# Patient Record
Sex: FEMALE | Race: BLACK OR AFRICAN AMERICAN | NOT HISPANIC OR LATINO | Employment: FULL TIME | URBAN - METROPOLITAN AREA
[De-identification: names, ages, dates, MRNs, and addresses within clinical notes are randomized per-mention and may not be internally consistent; named-entity substitution may affect disease eponyms.]

---

## 2018-06-28 ENCOUNTER — TELEPHONE (OUTPATIENT)
Dept: NEUROLOGY | Facility: CLINIC | Age: 58
End: 2018-06-28

## 2018-07-30 ENCOUNTER — OFFICE VISIT (OUTPATIENT)
Dept: NEUROLOGY | Facility: CLINIC | Age: 58
End: 2018-07-30
Payer: COMMERCIAL

## 2018-07-30 VITALS
HEART RATE: 60 BPM | DIASTOLIC BLOOD PRESSURE: 82 MMHG | BODY MASS INDEX: 35.5 KG/M2 | SYSTOLIC BLOOD PRESSURE: 124 MMHG | HEIGHT: 61 IN | WEIGHT: 188 LBS

## 2018-07-30 DIAGNOSIS — M54.16 RADICULOPATHY, LUMBAR REGION: Primary | ICD-10-CM

## 2018-07-30 PROCEDURE — 99204 OFFICE O/P NEW MOD 45 MIN: CPT | Performed by: PSYCHIATRY & NEUROLOGY

## 2018-07-30 RX ORDER — LORATADINE 10 MG/1
10 TABLET ORAL DAILY PRN
COMMUNITY
Start: 2018-03-09

## 2018-07-30 RX ORDER — GABAPENTIN 300 MG/1
300 CAPSULE ORAL 3 TIMES DAILY PRN
COMMUNITY
Start: 2017-08-07

## 2018-07-30 RX ORDER — AMLODIPINE BESYLATE AND BENAZEPRIL HYDROCHLORIDE 5; 20 MG/1; MG/1
1 CAPSULE ORAL EVERY MORNING
COMMUNITY
Start: 2018-07-30

## 2018-07-30 RX ORDER — CYCLOBENZAPRINE HCL 5 MG
5 TABLET ORAL DAILY PRN
COMMUNITY
Start: 2018-03-14

## 2018-07-30 NOTE — PROGRESS NOTES
Karla Gallo is a 60 y o  female  Chief Complaint   Patient presents with    Peripheral Neuropathy     left groin down leg to bottom of left foot       Assessment:  1  Radiculopathy, lumbar region        Plan:    Discussion:  Differential diagnosis discussed with the patient possible lumbar radiculopathy, though peripheral neuropathy is in the differential diagnosis, patient does not have any symptoms in the right leg, would recommend an MRI scan of the lumbar spine and EMG of bilateral lower extremity to evaluate for radiculopathy and neuropathy, patient to call me after the above tests discuss the results, patient has tried physical therapy in the past without much relief but we will repeat the physical therapy to see if she benefits from that,  she was advised to take fall and safety precautions, avoid strenuous activity, to go to the hospital if has any worsening symptoms and call me otherwise to see me back in 6-8 weeks and follow up with her other physicians  Subjective:    HPI   Patient is here for evaluation of left groin pain and low back pain radiating to the left leg associated with numbness and tingling for the last year and a half, according to the patient the symptoms are worse when she is walking and with activity and is about 6 on 10, relieved with rest, she denies any history of trauma, no bowel and bladder incontinence, no focal weakness, she was also having some left shoulder pain that has resolved, denies any numbness or tingling in the right leg except for occasional cramping in both feet at night time, she has tried physical therapy in the past without much relief, no other neurological complaints      Vitals:    07/30/18 1511   BP: 124/82   BP Location: Left arm   Patient Position: Sitting   Cuff Size: Adult   Pulse: 60   Weight: 85 3 kg (188 lb)   Height: 5' 1" (1 549 m)       Current Medications    Current Outpatient Prescriptions:     amLODIPine-benazepril (LOTREL 5-20) 5-20 MG per capsule, Take 1 capsule by mouth every morning, Disp: , Rfl:     cyclobenzaprine (FLEXERIL) 5 mg tablet, Take 5 mg by mouth daily as needed, Disp: , Rfl:     gabapentin (NEURONTIN) 300 mg capsule, Take 300 mg by mouth 3 (three) times a day as needed  , Disp: , Rfl:     loratadine (CLARITIN) 10 mg tablet, Take 10 mg by mouth daily as needed, Disp: , Rfl:     triamcinolone (KENALOG) 0 1 % ointment, Apply topically daily as needed, Disp: , Rfl:       Allergies  Patient has no known allergies  Past Medical History  Past Medical History:   Diagnosis Date    Arthritis     Chronic low back pain     Hypercholesteremia     Hypertension     Kidney stone          Past Surgical History:  Past Surgical History:   Procedure Laterality Date    LIPOMA RESECTION      stomach         Family History:  Family History   Problem Relation Age of Onset    Heart disease Mother     Diabetes Mother     Hypertension Mother     No Known Problems Father     No Known Problems Sister     Diabetes Brother     Alzheimer's disease Maternal Grandmother        Social History:   reports that she has never smoked  She has never used smokeless tobacco  She reports that she drinks alcohol  I have reviewed the past medical history, surgical history, social and family history, current medications, allergies vitals, review of systems, and updated this information as appropriate today  Objective:    Physical Exam    Neurological Exam  Patient is alert awake oriented, high functions are intact, speech is fluent  No evidence of any aphasia or dysarthria  Cranial nerve examination reveals visual fields are full to threat, pupils equal and reactive, extraocular movements intact, fundi showed sharp disc margins, sensation in the V1 V2 V3 distribution is symmetric, no obvious facial asymmetry noted, tongue is midline and gag is adequate  Hearing is intact bilaterally, shoulder shrug is intact bilaterally    Motor examination reveals normal tone and bulk, no evidence of any drift to the outstretched extremities, strength is 5/5 preserved bilaterally in both upper and lower extremities, deep tendon reflexes are intact, toes are downgoing  Sensory examination decreased light touch pinprick temperature sensation in the left leg on the lateral aspect compared to the right, patient does not extinguish double simultaneous stimuli  Coordination no evidence of any finger-to-nose dysmetria, no evidence of any dysdiadochokinesia,  Gait is normal based Romberg sign is negative  Paraspinal muscle tenderness in the lumbar area    ROS:  Review of Systems   Constitutional: Negative  Negative for appetite change and fever  HENT: Negative  Negative for hearing loss, tinnitus, trouble swallowing and voice change  Eyes: Negative  Negative for photophobia and pain  Respiratory: Negative  Negative for shortness of breath  Cardiovascular: Negative  Negative for palpitations  Gastrointestinal: Positive for abdominal pain  Negative for nausea and vomiting  Endocrine: Negative  Negative for cold intolerance and heat intolerance  Genitourinary: Negative  Negative for dysuria, frequency and urgency  Musculoskeletal: Positive for arthralgias, back pain, gait problem, myalgias and neck pain  Skin: Negative  Negative for rash  Neurological: Positive for numbness and headaches  Negative for dizziness, tremors, seizures, syncope, facial asymmetry, speech difficulty, weakness and light-headedness  Hematological: Negative  Does not bruise/bleed easily  Psychiatric/Behavioral: Negative  Negative for confusion, hallucinations and sleep disturbance

## 2018-08-29 ENCOUNTER — HOSPITAL ENCOUNTER (OUTPATIENT)
Dept: MRI IMAGING | Facility: CLINIC | Age: 58
Discharge: HOME | End: 2018-08-29
Payer: COMMERCIAL

## 2018-08-29 DIAGNOSIS — M54.16 RADICULOPATHY, LUMBAR REGION: ICD-10-CM

## 2018-08-29 PROCEDURE — 72148 MRI LUMBAR SPINE W/O DYE: CPT

## 2018-09-12 ENCOUNTER — PROCEDURE VISIT (OUTPATIENT)
Dept: NEUROLOGY | Facility: CLINIC | Age: 58
End: 2018-09-12
Payer: COMMERCIAL

## 2018-09-12 DIAGNOSIS — M54.16 RADICULOPATHY, LUMBAR REGION: ICD-10-CM

## 2018-09-12 PROCEDURE — 95910 NRV CNDJ TEST 7-8 STUDIES: CPT | Performed by: PHYSICAL MEDICINE & REHABILITATION

## 2018-09-12 PROCEDURE — 95886 MUSC TEST DONE W/N TEST COMP: CPT | Performed by: PHYSICAL MEDICINE & REHABILITATION

## 2018-09-12 NOTE — PROGRESS NOTES
The procedure was discussed with the patient  Verbal consent was obtained after discussing risks and benefits  A sterile concentric needle electrode was used  The patient tolerated the procedure well  There were no complications  EMG BILATERAL LOWER EXTREMITY    Motor and sensory conduction studies were performed on the bilateral peroneal, tibial and sural nerves  The distal motor latencies were normal  The motor action potential amplitudes were normal  Conduction velocities were normal including conduction of the peroneal nerve across the fibular head  Bilateral peroneal and tibial F waves were normal     Bilateral sural distal sensory latencies were normal with normal sensory action potential amplitudes  H  reflexes were symmetrically normal     Concentric needle EMG was performed in various distal and proximal muscles of the lower extremities bilaterally including EDB, tibialis anterior, gastrocnemius medius, vastus lateralis, gluteus medius and the low lumbar paraspinal regions  There is no evidence of spontaneous activity seen  Mild decreased recruitment of giant motor units was noted in the right gluteus medius and EDB  The compound motor unit potentials were of normal configuration and interference patterns were full or full for effort in the remaining muscles tested  IMPRESSION: There is electrophysiologic evidence of a:    1  Mild chronic L5 radiculopathy on the right as evidenced by the decreased recruitment and chronic denervation changes in the above-mentioned muscles  Clinical and imaging correlation of the lumbar spine is suggested      YASMIN Mckeon

## 2018-10-08 ENCOUNTER — OFFICE VISIT (OUTPATIENT)
Dept: NEUROLOGY | Facility: CLINIC | Age: 58
End: 2018-10-08
Payer: COMMERCIAL

## 2018-10-08 VITALS
HEART RATE: 62 BPM | SYSTOLIC BLOOD PRESSURE: 138 MMHG | HEIGHT: 62 IN | WEIGHT: 186 LBS | DIASTOLIC BLOOD PRESSURE: 96 MMHG | BODY MASS INDEX: 34.23 KG/M2

## 2018-10-08 DIAGNOSIS — M54.16 RADICULOPATHY, LUMBAR REGION: Primary | ICD-10-CM

## 2018-10-08 PROCEDURE — 99213 OFFICE O/P EST LOW 20 MIN: CPT | Performed by: PSYCHIATRY & NEUROLOGY

## 2018-10-08 NOTE — PROGRESS NOTES
Stanislaw Ritter is a 60 y o  female  Chief Complaint   Patient presents with    Back Pain       Assessment:  1  Radiculopathy, lumbar region        Plan:    Discussion:  Differential diagnosis discussed with the patient, her MRI scan of the lumbar spine an EMG results were reviewed with her, she could be having lumbar radiculopathy versus meralgia paresthetica, she finished physical therapy with some relief, she is currently not taking Neurontin, we discussed about it she is willing to try, she does have the medication, she is going to take 300 milligrams at nighttime and stop if experiences any side effects, she is also going to discuss with her family physician and see an orthopedic surgeon, avoid strenuous activity, to go to the hospital if has any worsening symptoms and call me otherwise to see me back in 4 months and follow up with her other physicians,    Subjective:    HPI   Patient is here in follow-up left groin and leg pain associated with some numbness and tingling, since her last visit she does not have any back pain, she went for physical therapy which helped her to some extent, she still has pain it is more of a burning pain in the groin area radiating to the left leg associated with some numbness and tingling, no bowel and bladder incontinence,  no focal weakness    She does not know the triggering factors, pain is slightly relieved with rest, no other complaints    Vitals:    10/08/18 1240   BP: 138/96   Pulse: 62   Weight: 84 4 kg (186 lb)   Height: 5' 2" (1 575 m)       Current Medications    Current Outpatient Prescriptions:     amLODIPine-benazepril (LOTREL 5-20) 5-20 MG per capsule, Take 1 capsule by mouth every morning, Disp: , Rfl:     cyclobenzaprine (FLEXERIL) 5 mg tablet, Take 5 mg by mouth daily as needed, Disp: , Rfl:     gabapentin (NEURONTIN) 300 mg capsule, Take 300 mg by mouth 3 (three) times a day as needed  , Disp: , Rfl:     loratadine (CLARITIN) 10 mg tablet, Take 10 mg by mouth daily as needed, Disp: , Rfl:     triamcinolone (KENALOG) 0 1 % ointment, Apply topically daily as needed, Disp: , Rfl:       Allergies  Patient has no known allergies  Past Medical History  Past Medical History:   Diagnosis Date    Arthritis     Chronic low back pain     Hypercholesteremia     Hypertension     Kidney stone     Lumbar radiculopathy          Past Surgical History:  Past Surgical History:   Procedure Laterality Date    LIPOMA RESECTION      stomach         Family History:  Family History   Problem Relation Age of Onset    Heart disease Mother     Diabetes Mother     Hypertension Mother     No Known Problems Father     No Known Problems Sister     Diabetes Brother     Alzheimer's disease Maternal Grandmother        Social History:   reports that she has never smoked  She has never used smokeless tobacco  She reports that she drinks alcohol  She reports that she does not use drugs  I have reviewed the past medical history, surgical history, social and family history, current medications, allergies vitals, review of systems, and updated this information as appropriate today  Objective:    Physical Exam    Neurological Exam    GENERAL:  Cooperative in no acute distress  Well-developed and well-nourished    HEAD and NECK   Head is atraumatic normocephalic with no lesions or masses  Neck is supple with full range of motion    CARDIOVASCULAR  Carotid Arteries-no carotid bruits  NEUROLOGIC:  Mental Status-the patient is awake alert and oriented without aphasia or apraxia  Cranial Nerves: Visual fields are full to confrontation  Extraocular movements are full without nystagmus  Pupils are 2-1/2 mm and reactive  Face is symmetrical to light touch  Movements of facial expression move symmetrically  Hearing is normal to finger rub bilaterally  Soft palate lifts symmetrically  Shoulder shrug is symmetrical  Tongue is midline without atrophy    Motor: No drift is noted on arm extension  Strength is full in the upper and lower extremities with normal bulk and tone  Sensory: Intact to temperature and vibratory sensation in the upper and lower extremities bilaterally  Cortical function is intact  Coordination: Finger to nose testing is performed accurately  Romberg is negative  Gait reveals a normal base with symmetrical arm swing  Reflexes:  1+ and symmetrical   No lumbar spine tenderness  ROS:  Review of Systems   Constitutional: Negative  HENT: Negative  Eyes: Negative  Respiratory: Negative  Cardiovascular: Negative  Gastrointestinal: Negative  Endocrine: Negative  Genitourinary: Negative  Musculoskeletal: Positive for back pain and joint swelling  Skin: Negative  Allergic/Immunologic: Negative  Neurological: Negative  Hematological: Negative  Psychiatric/Behavioral: Negative

## 2019-02-26 ENCOUNTER — TELEPHONE (OUTPATIENT)
Dept: NEUROLOGY | Facility: CLINIC | Age: 59
End: 2019-02-26

## 2019-02-26 NOTE — TELEPHONE ENCOUNTER
Called patient to see if she would be available to come in on Thursday February 28 at 830  Patient is on wait list to be seen sooner  No answer  Left voicemail  Detail Level: Zone Detail Level: Detailed

## 2020-02-18 ENCOUNTER — APPOINTMENT (EMERGENCY)
Dept: CT IMAGING | Facility: HOSPITAL | Age: 60
End: 2020-02-18
Payer: COMMERCIAL

## 2020-02-18 ENCOUNTER — TELEPHONE (OUTPATIENT)
Dept: OTHER | Facility: HOSPITAL | Age: 60
End: 2020-02-18

## 2020-02-18 ENCOUNTER — HOSPITAL ENCOUNTER (EMERGENCY)
Facility: HOSPITAL | Age: 60
Discharge: HOME | End: 2020-02-18
Attending: EMERGENCY MEDICINE | Admitting: EMERGENCY MEDICINE
Payer: COMMERCIAL

## 2020-02-18 VITALS
HEIGHT: 62 IN | TEMPERATURE: 97.8 F | RESPIRATION RATE: 20 BRPM | DIASTOLIC BLOOD PRESSURE: 97 MMHG | HEART RATE: 87 BPM | OXYGEN SATURATION: 99 % | WEIGHT: 180 LBS | SYSTOLIC BLOOD PRESSURE: 197 MMHG | BODY MASS INDEX: 33.13 KG/M2

## 2020-02-18 DIAGNOSIS — N20.0 KIDNEY STONE ON LEFT SIDE: Primary | ICD-10-CM

## 2020-02-18 DIAGNOSIS — N13.30 HYDRONEPHROSIS: ICD-10-CM

## 2020-02-18 LAB
ALBUMIN SERPL BCP-MCNC: 3.3 G/DL (ref 3.5–5)
ALP SERPL-CCNC: 99 U/L (ref 46–116)
ALT SERPL W P-5'-P-CCNC: 40 U/L (ref 12–78)
ANION GAP SERPL CALCULATED.3IONS-SCNC: 10 MMOL/L (ref 4–13)
AST SERPL W P-5'-P-CCNC: 20 U/L (ref 5–45)
BACTERIA UR QL AUTO: ABNORMAL /HPF
BASOPHILS # BLD AUTO: 0.03 THOUSANDS/ΜL (ref 0–0.1)
BASOPHILS NFR BLD AUTO: 0 % (ref 0–1)
BILIRUB SERPL-MCNC: 0.2 MG/DL (ref 0.2–1)
BILIRUB UR QL STRIP: NEGATIVE
BUN SERPL-MCNC: 16 MG/DL (ref 5–25)
CALCIUM SERPL-MCNC: 9.4 MG/DL (ref 8.3–10.1)
CHLORIDE SERPL-SCNC: 103 MMOL/L (ref 100–108)
CLARITY UR: CLEAR
CO2 SERPL-SCNC: 28 MMOL/L (ref 21–32)
COLOR UR: YELLOW
CREAT SERPL-MCNC: 1.05 MG/DL (ref 0.6–1.3)
EOSINOPHIL # BLD AUTO: 0.06 THOUSAND/ΜL (ref 0–0.61)
EOSINOPHIL NFR BLD AUTO: 1 % (ref 0–6)
ERYTHROCYTE [DISTWIDTH] IN BLOOD BY AUTOMATED COUNT: 13.1 % (ref 11.6–15.1)
GFR SERPL CREATININE-BSD FRML MDRD: 58 ML/MIN/1.73SQ M
GLUCOSE SERPL-MCNC: 114 MG/DL (ref 65–140)
GLUCOSE UR STRIP-MCNC: NEGATIVE MG/DL
HCT VFR BLD AUTO: 43.3 % (ref 34.8–46.1)
HGB BLD-MCNC: 13.5 G/DL (ref 11.5–15.4)
HGB UR QL STRIP.AUTO: ABNORMAL
IMM GRANULOCYTES # BLD AUTO: 0.06 THOUSAND/UL (ref 0–0.2)
IMM GRANULOCYTES NFR BLD AUTO: 1 % (ref 0–2)
KETONES UR STRIP-MCNC: NEGATIVE MG/DL
LEUKOCYTE ESTERASE UR QL STRIP: ABNORMAL
LIPASE SERPL-CCNC: 189 U/L (ref 73–393)
LYMPHOCYTES # BLD AUTO: 2.44 THOUSANDS/ΜL (ref 0.6–4.47)
LYMPHOCYTES NFR BLD AUTO: 24 % (ref 14–44)
MCH RBC QN AUTO: 30.8 PG (ref 26.8–34.3)
MCHC RBC AUTO-ENTMCNC: 31.2 G/DL (ref 31.4–37.4)
MCV RBC AUTO: 99 FL (ref 82–98)
MONOCYTES # BLD AUTO: 0.5 THOUSAND/ΜL (ref 0.17–1.22)
MONOCYTES NFR BLD AUTO: 5 % (ref 4–12)
NEUTROPHILS # BLD AUTO: 7.29 THOUSANDS/ΜL (ref 1.85–7.62)
NEUTS SEG NFR BLD AUTO: 69 % (ref 43–75)
NITRITE UR QL STRIP: NEGATIVE
NON-SQ EPI CELLS URNS QL MICRO: ABNORMAL /HPF
NRBC BLD AUTO-RTO: 0 /100 WBCS
PH UR STRIP.AUTO: 7 [PH]
PLATELET # BLD AUTO: 268 THOUSANDS/UL (ref 149–390)
PMV BLD AUTO: 9.8 FL (ref 8.9–12.7)
POTASSIUM SERPL-SCNC: 3.8 MMOL/L (ref 3.5–5.3)
PROT SERPL-MCNC: 7.7 G/DL (ref 6.4–8.2)
PROT UR STRIP-MCNC: NEGATIVE MG/DL
RBC # BLD AUTO: 4.39 MILLION/UL (ref 3.81–5.12)
RBC #/AREA URNS AUTO: ABNORMAL /HPF
SODIUM SERPL-SCNC: 141 MMOL/L (ref 136–145)
SP GR UR STRIP.AUTO: 1.01 (ref 1–1.03)
UROBILINOGEN UR QL STRIP.AUTO: 0.2 E.U./DL
WBC # BLD AUTO: 10.38 THOUSAND/UL (ref 4.31–10.16)
WBC #/AREA URNS AUTO: ABNORMAL /HPF

## 2020-02-18 PROCEDURE — 74176 CT ABD & PELVIS W/O CONTRAST: CPT

## 2020-02-18 PROCEDURE — 85025 COMPLETE CBC W/AUTO DIFF WBC: CPT | Performed by: EMERGENCY MEDICINE

## 2020-02-18 PROCEDURE — 83690 ASSAY OF LIPASE: CPT | Performed by: EMERGENCY MEDICINE

## 2020-02-18 PROCEDURE — 99284 EMERGENCY DEPT VISIT MOD MDM: CPT

## 2020-02-18 PROCEDURE — 36415 COLL VENOUS BLD VENIPUNCTURE: CPT | Performed by: EMERGENCY MEDICINE

## 2020-02-18 PROCEDURE — 80053 COMPREHEN METABOLIC PANEL: CPT | Performed by: EMERGENCY MEDICINE

## 2020-02-18 PROCEDURE — 99285 EMERGENCY DEPT VISIT HI MDM: CPT | Performed by: EMERGENCY MEDICINE

## 2020-02-18 PROCEDURE — 81001 URINALYSIS AUTO W/SCOPE: CPT | Performed by: EMERGENCY MEDICINE

## 2020-02-18 PROCEDURE — 96374 THER/PROPH/DIAG INJ IV PUSH: CPT

## 2020-02-18 PROCEDURE — 96375 TX/PRO/DX INJ NEW DRUG ADDON: CPT

## 2020-02-18 RX ORDER — ONDANSETRON 2 MG/ML
INJECTION INTRAMUSCULAR; INTRAVENOUS
Status: COMPLETED
Start: 2020-02-18 | End: 2020-02-18

## 2020-02-18 RX ORDER — KETOROLAC TROMETHAMINE 30 MG/ML
15 INJECTION, SOLUTION INTRAMUSCULAR; INTRAVENOUS ONCE
Status: COMPLETED | OUTPATIENT
Start: 2020-02-18 | End: 2020-02-18

## 2020-02-18 RX ORDER — HYDROMORPHONE HCL/PF 1 MG/ML
0.5 SYRINGE (ML) INJECTION ONCE AS NEEDED
Status: COMPLETED | OUTPATIENT
Start: 2020-02-18 | End: 2020-02-18

## 2020-02-18 RX ORDER — NAPROXEN 500 MG/1
500 TABLET ORAL 2 TIMES DAILY PRN
Qty: 20 TABLET | Refills: 0 | Status: SHIPPED | OUTPATIENT
Start: 2020-02-18 | End: 2020-03-31

## 2020-02-18 RX ORDER — ONDANSETRON 4 MG/1
4 TABLET, ORALLY DISINTEGRATING ORAL EVERY 8 HOURS PRN
Qty: 10 TABLET | Refills: 0 | Status: SHIPPED | OUTPATIENT
Start: 2020-02-18 | End: 2020-03-31

## 2020-02-18 RX ORDER — MORPHINE SULFATE 15 MG/1
15 TABLET ORAL EVERY 6 HOURS PRN
Qty: 11 TABLET | Refills: 0 | Status: SHIPPED | OUTPATIENT
Start: 2020-02-18 | End: 2020-02-25

## 2020-02-18 RX ORDER — TAMSULOSIN HYDROCHLORIDE 0.4 MG/1
0.4 CAPSULE ORAL
Qty: 5 CAPSULE | Refills: 0 | Status: SHIPPED | OUTPATIENT
Start: 2020-02-18 | End: 2020-02-21 | Stop reason: SDUPTHER

## 2020-02-18 RX ORDER — ONDANSETRON 2 MG/ML
4 INJECTION INTRAMUSCULAR; INTRAVENOUS ONCE
Status: COMPLETED | OUTPATIENT
Start: 2020-02-18 | End: 2020-02-18

## 2020-02-18 RX ADMIN — ONDANSETRON 4 MG: 2 INJECTION INTRAMUSCULAR; INTRAVENOUS at 04:26

## 2020-02-18 RX ADMIN — HYDROMORPHONE HYDROCHLORIDE 0.5 MG: 1 INJECTION, SOLUTION INTRAMUSCULAR; INTRAVENOUS; SUBCUTANEOUS at 04:24

## 2020-02-18 RX ADMIN — KETOROLAC TROMETHAMINE 15 MG: 30 INJECTION, SOLUTION INTRAMUSCULAR at 04:20

## 2020-02-18 NOTE — ED PROVIDER NOTES
History  Chief Complaint   Patient presents with    Abdominal Pain     was sleeping and woke up with left flank pain that radiates into left lower abd/groin area, also states she feels like she has to pee but can't      60y o  year-old female presents with 2 hours of left flank pain with radiation down the left flank associated with urinary hesitancy  Patient describes severe sharp pain that came on suddenly and continues in the ER  Patient states nothing aggravates the pain and nothing alleviates it  Patient has a history of prior incidentally noted kidney stones but denies history of renal colic  Patient has a history of chronic lumbar radiculopathy but states this pain is different than her prior episodes that she sees pain management  ROS: Patient denies fever/chills, nausea/vomiting, diarrhea, dyspnea, anorexia, constipation, diaphoresis, chest pain, groin pain, dysuria, hematuria, melena, or back/neck pain  All other systems reviewed and negative  Objective:   Vital signs reviewed  Constitutional: moderate acute distress  Eyes: No scleral icterus  Respiratory: Lungs clear to auscultation bilaterally without adventitious sounds  Abdomen: Inspection of an obese abdomen with previous abdominal surgical incisions noted without erythema, rashes or ecchymosis noted  No abdominal pulsations noted  Normal bowel sounds with no bruit auscultated  Soft abdomen  Palpitation noted no anterior tenderness; tenderness not over McBurneys point  No masses or pulsatile aorta noted on examination  No rebound or guarding noted on examination, non-peritoneal exam  Negative psoas, obturator, and Rovsings signs  Negative Salvadors sign  Back: Normal inspection with no rash or signs of herpes zoster  Costovertebral tenderness present on the left  Skin: No ecchymosis of the umbilicus (negative Trevors sign) or flank (negative Werner Knowless sign)  Warm and dry       Medical Decision Making   Patient presents with flank pain with a broad differential including intra-abdominal pathologies such as nephrolithiasis and pyelonephritis  As patient notes associated urinary symptoms, most likely associated with renal pathology  Patient denies poorly controlled diabetes, chronic kidney disease, history of congenital urinary abnormality or renal transplant, or history of immunocompromised state  Based on the patients presentation and symptoms, laboratory evaluation to include urinalysis to evaluate for hematuria and infectious etiologies, BMP to evaluate renal function and electrolytes, and CBC to evaluate for leukocytosis will be completed  Non-contrast CT imaging will be completed to evaluate for nephrolithiasis or underlying anatomic abnormality to detect processes that may delay response to therapy or complications such as renal or perinephric assesses  Flank Pain   Pain location:  L flank  Pain quality: sharp    Pain radiates to:  L leg  Pain severity:  Severe  Onset quality:  Sudden  Timing:  Constant  Progression:  Unchanged  Relieved by:  Nothing  Worsened by:  Nothing  Associated symptoms: no anorexia, no belching, no chest pain, no chills, no constipation, no cough, no diarrhea, no dysuria, no fatigue, no fever, no hematemesis, no hematochezia, no hematuria, no melena, no nausea, no shortness of breath, no sore throat, no vaginal bleeding, no vaginal discharge and no vomiting        Prior to Admission Medications   Prescriptions Last Dose Informant Patient Reported? Taking?    amLODIPine-benazepril (LOTREL 5-20) 5-20 MG per capsule   Yes No   Sig: Take 1 capsule by mouth every morning   cyclobenzaprine (FLEXERIL) 5 mg tablet   Yes No   Sig: Take 5 mg by mouth daily as needed   gabapentin (NEURONTIN) 300 mg capsule   Yes No   Sig: Take 300 mg by mouth 3 (three) times a day as needed     loratadine (CLARITIN) 10 mg tablet   Yes No   Sig: Take 10 mg by mouth daily as needed   triamcinolone (KENALOG) 0 1 % ointment   Yes No   Sig: Apply topically daily as needed      Facility-Administered Medications: None       Past Medical History:   Diagnosis Date    Arthritis     Chronic low back pain     Hypercholesteremia     Hypertension     Kidney stone     Lumbar radiculopathy        Past Surgical History:   Procedure Laterality Date    LIPOMA RESECTION      stomach       Family History   Problem Relation Age of Onset    Heart disease Mother     Diabetes Mother     Hypertension Mother     No Known Problems Father     No Known Problems Sister     Diabetes Brother     Alzheimer's disease Maternal Grandmother      I have reviewed and agree with the history as documented  Social History     Tobacco Use    Smoking status: Never Smoker    Smokeless tobacco: Never Used   Substance Use Topics    Alcohol use: Yes     Comment: socially    Drug use: No       Review of Systems   Constitutional: Negative for chills, fatigue and fever  HENT: Negative for sore throat  Respiratory: Negative for cough and shortness of breath  Cardiovascular: Negative for chest pain  Gastrointestinal: Negative for anorexia, constipation, diarrhea, hematemesis, hematochezia, melena, nausea and vomiting  Genitourinary: Positive for flank pain  Negative for dysuria, hematuria, vaginal bleeding and vaginal discharge  All other systems reviewed and are negative  Physical Exam  Physical Exam   Constitutional: She appears well-developed and well-nourished  She appears distressed  HENT:   Head: Normocephalic and atraumatic  Eyes: Pupils are equal, round, and reactive to light  Neck: Neck supple  Cardiovascular: Normal rate and regular rhythm  Pulmonary/Chest: Effort normal and breath sounds normal    Abdominal: Soft  Bowel sounds are normal  She exhibits no distension  There is no tenderness  There is CVA tenderness  There is no rigidity, no rebound and no guarding  Musculoskeletal: She exhibits no deformity  Neurological: She is alert  Skin: Skin is warm and dry  Psychiatric: She has a normal mood and affect  Vitals reviewed        Vital Signs  ED Triage Vitals [02/18/20 0310]   Temperature Pulse Respirations Blood Pressure SpO2   97 8 °F (36 6 °C) 87 20 (!) 197/97 99 %      Temp Source Heart Rate Source Patient Position - Orthostatic VS BP Location FiO2 (%)   Oral Monitor Sitting Right arm --      Pain Score       8           Vitals:    02/18/20 0310   BP: (!) 197/97   Pulse: 87   Patient Position - Orthostatic VS: Sitting         Visual Acuity      ED Medications  Medications   ketorolac (TORADOL) injection 15 mg (15 mg Intravenous Given 2/18/20 0420)   HYDROmorphone (DILAUDID) injection 0 5 mg (0 5 mg Intravenous Given 2/18/20 0424)   ondansetron (ZOFRAN) injection 4 mg (4 mg Intravenous Given 2/18/20 0426)       Diagnostic Studies  Results Reviewed     Procedure Component Value Units Date/Time    Comprehensive metabolic panel [[id number]]  (Abnormal) Collected:  02/18/20 0332    Lab Status:  Final result Specimen:  Blood from Arm, Left Updated:  02/18/20 0400     Sodium 141 mmol/L      Potassium 3 8 mmol/L      Chloride 103 mmol/L      CO2 28 mmol/L      ANION GAP 10 mmol/L      BUN 16 mg/dL      Creatinine 1 05 mg/dL      Glucose 114 mg/dL      Calcium 9 4 mg/dL      AST 20 U/L      ALT 40 U/L      Alkaline Phosphatase 99 U/L      Total Protein 7 7 g/dL      Albumin 3 3 g/dL      Total Bilirubin 0 20 mg/dL      eGFR 58 ml/min/1 73sq m     Narrative:       Marjan guidelines for Chronic Kidney Disease (CKD):     Stage 1 with normal or high GFR (GFR > 90 mL/min/1 73 square meters)    Stage 2 Mild CKD (GFR = 60-89 mL/min/1 73 square meters)    Stage 3A Moderate CKD (GFR = 45-59 mL/min/1 73 square meters)    Stage 3B Moderate CKD (GFR = 30-44 mL/min/1 73 square meters)    Stage 4 Severe CKD (GFR = 15-29 mL/min/1 73 square meters)    Stage 5 End Stage CKD (GFR <15 mL/min/1 73 square meters)  Note: GFR calculation is accurate only with a steady state creatinine    Lipase [[id number]]  (Normal) Collected:  02/18/20 0332    Lab Status:  Final result Specimen:  Blood from Arm, Left Updated:  02/18/20 0400     Lipase 189 u/L     Urine Microscopic [[id number]]  (Abnormal) Collected:  02/18/20 0325    Lab Status:  Final result Specimen:  Urine, Clean Catch Updated:  02/18/20 0354     RBC, UA 30-50 /hpf      WBC, UA 1-2 /hpf      Epithelial Cells Occasional /hpf      Bacteria, UA Occasional /hpf     UA w Reflex to Microscopic w Reflex to Culture [[id number]]  (Abnormal) Collected:  02/18/20 0325    Lab Status:  Final result Specimen:  Urine, Clean Catch Updated:  02/18/20 0340     Color, UA Yellow     Clarity, UA Clear     Specific Gravity, UA 1 010     pH, UA 7 0     Leukocytes, UA Trace     Nitrite, UA Negative     Protein, UA Negative mg/dl      Glucose, UA Negative mg/dl      Ketones, UA Negative mg/dl      Urobilinogen, UA 0 2 E U /dl      Bilirubin, UA Negative     Blood, UA Large    CBC and differential [[id number]]  (Abnormal) Collected:  02/18/20 0332    Lab Status:  Final result Specimen:  Blood from Arm, Left Updated:  02/18/20 0339     WBC 10 38 Thousand/uL      RBC 4 39 Million/uL      Hemoglobin 13 5 g/dL      Hematocrit 43 3 %      MCV 99 fL      MCH 30 8 pg      MCHC 31 2 g/dL      RDW 13 1 %      MPV 9 8 fL      Platelets 257 Thousands/uL      nRBC 0 /100 WBCs      Neutrophils Relative 69 %      Immat GRANS % 1 %      Lymphocytes Relative 24 %      Monocytes Relative 5 %      Eosinophils Relative 1 %      Basophils Relative 0 %      Neutrophils Absolute 7 29 Thousands/µL      Immature Grans Absolute 0 06 Thousand/uL      Lymphocytes Absolute 2 44 Thousands/µL      Monocytes Absolute 0 50 Thousand/µL      Eosinophils Absolute 0 06 Thousand/µL      Basophils Absolute 0 03 Thousands/µL                  CT abdomen pelvis wo contrast   Final Result by Laurel Mills MD (02/18 8867) 1   Moderate to severe left hydroureteronephrosis due to 6 mm obstructing calculus at the UVJ  2   Small hiatal hernia  The study was marked in [facility] for immediate notification  Workstation performed: [id number]                    Procedures  Procedures         ED Course  ED Course as of Feb 18 0602   Tue Feb 18, 2020   Tushar Coppola Prior urology assessment:    Assessment  This patient has a left lower pole renal stone measuring 10 mm  I'm not sure this was the cause of her pain  I certainly don't think was the cause of her epigastric pain but may be the cause of her flank pain  It is not in a position which is causing obstruction  I reviewed these findings with the patient  We talked about various treatment options  One option would be to observe this stone with a follow-up imaging in 6 months  Another option would be to treat this stone and I think the best way to treated with B with lithotripsy  I could not guarantee her that her pain would get better with treatment of the stone  Given that she is decided to just watch the stone so will get repeat imaging in 6 months         0503 Discussed with Urology (Martell Dai) who agreed is appropriate trial patient on attempted outpatient management  She will with the patient for outpatient management and stenting if necessary  5661 Patient has a left kidney stone, likely the source of her symptoms  I explained this to the patient, showed her imaging  I discussed symptomatic management  Patient previously had 10 mm stone though it is currently 6 mm at the UVJ  I discussed likely passing however the need for follow-up with Urology in case symptoms persist     Discussed symptomatic management in detail including risks and benefits of these medications  Specifically discussed risks of opiate use with the patient  Patient does see pain management but is not on opiates    Patient with appropriate follow-up regarding that and this is a new diagnosis where opiates are appropriate adjunct  Discussed risks of tamsulosin, patient has a kidney stone that is greater than 5 mm that may benefit from expulsive therapy  Discussed particular orthostasis associated with this  Discussed the need for follow-up with Urology and return precautions in detail  7046 I have reasonably determine that electronically prescribing a controlled substance would be impractical for the patient to obtain the controlled substance prescribed by electronic prescription or would cause an untimely delay resulting in an adverse impact on the patient's medical condition   aware queried with no unusual prescription patterns  MDM      Disposition  Final diagnoses:   Kidney stone on left side   Hydronephrosis     Time reflects when diagnosis was documented in both MDM as applicable and the Disposition within this note     Time User Action Codes Description Comment    2/18/2020  5:22 AM Delia Freeman Add [N20 0] Kidney stone on left side     2/18/2020  5:22 AM Delia Freeman Add [N13 30] Hydronephrosis       ED Disposition     ED Disposition Condition Date/Time Comment    Discharge Stable Tue Feb 18, 2020  5:22 AM Thressa Hatchet A Romney discharge to home/self care  Follow-up Information     Follow up With Specialties Details Why Contact Info Additional [address] For Urology [facility] Urology Schedule an appointment as soon as possible for a visit in 3 days Follow-up on kidney stone   [address] [address]  701  [facility], [address], [address], [facility], [place], [address]    [facility] Emergency Department Emergency Medicine Go to  If symptoms worsen [address] [facility] [address]  [address] [place] [address] ED, [address] [place], [place], [address]          Discharge Medication List as of 2/18/2020  5:27 AM      START taking these medications    Details   morphine (MSIR) 15 mg tablet Take 1 tablet (15 mg total) by mouth every 6 (six) hours as needed for severe pain for up to 7 daysMax Daily Amount: 60 mg, Starting Tue 2/18/2020, Until Tue 2/25/2020, Print      naproxen (NAPROSYN) 500 mg tablet Take 1 tablet (500 mg total) by mouth 2 (two) times a day as needed for moderate pain for up to 20 doses, Starting Tue 2/18/2020, Print      ondansetron (ZOFRAN-ODT) 4 mg disintegrating tablet Take 1 tablet (4 mg total) by mouth every 8 (eight) hours as needed for nausea or vomiting, Starting Tue 2/18/2020, Print      tamsulosin (FLOMAX) 0 4 mg Take 1 capsule (0 4 mg total) by mouth daily with dinner for 5 days, Starting Tue 2/18/2020, Until Sun 2/23/2020, Print         CONTINUE these medications which have NOT CHANGED    Details   amLODIPine-benazepril (LOTREL 5-20) 5-20 MG per capsule Take 1 capsule by mouth every morning, Starting Mon 7/30/2018, Historical Med      cyclobenzaprine (FLEXERIL) 5 mg tablet Take 5 mg by mouth daily as needed, Starting Wed 3/14/2018, Historical Med      gabapentin (NEURONTIN) 300 mg capsule Take 300 mg by mouth 3 (three) times a day as needed  , Starting Mon 8/7/2017, Historical Med      loratadine (CLARITIN) 10 mg tablet Take 10 mg by mouth daily as needed, Starting Fri 3/9/2018, Historical Med      triamcinolone (KENALOG) 0 1 % ointment Apply topically daily as needed, Starting Wed 3/22/2017, Historical Med           No discharge procedures on file      PDMP Review       Value Time User    PDMP Reviewed  Yes 2/18/2020  5:24 AM Michaela Mckeon MD          ED Provider  Electronically Signed by           Michaela Mckeon MD  02/18/20 5310

## 2020-02-21 ENCOUNTER — OFFICE VISIT (OUTPATIENT)
Dept: UROLOGY | Facility: CLINIC | Age: 60
End: 2020-02-21
Payer: COMMERCIAL

## 2020-02-21 VITALS
HEART RATE: 84 BPM | WEIGHT: 186 LBS | SYSTOLIC BLOOD PRESSURE: 126 MMHG | HEIGHT: 62 IN | BODY MASS INDEX: 34.23 KG/M2 | DIASTOLIC BLOOD PRESSURE: 70 MMHG

## 2020-02-21 DIAGNOSIS — N20.0 KIDNEY STONE ON LEFT SIDE: ICD-10-CM

## 2020-02-21 DIAGNOSIS — N20.0 RENAL STONES: Primary | ICD-10-CM

## 2020-02-21 LAB
SL AMB  POCT GLUCOSE, UA: ABNORMAL
SL AMB LEUKOCYTE ESTERASE,UA: ABNORMAL
SL AMB POCT BILIRUBIN,UA: ABNORMAL
SL AMB POCT BLOOD,UA: ABNORMAL
SL AMB POCT CLARITY,UA: CLEAR
SL AMB POCT COLOR,UA: YELLOW
SL AMB POCT KETONES,UA: ABNORMAL
SL AMB POCT NITRITE,UA: ABNORMAL
SL AMB POCT PH,UA: 6
SL AMB POCT SPECIFIC GRAVITY,UA: 1.02
SL AMB POCT URINE PROTEIN: ABNORMAL
SL AMB POCT UROBILINOGEN: ABNORMAL

## 2020-02-21 PROCEDURE — 99204 OFFICE O/P NEW MOD 45 MIN: CPT | Performed by: PHYSICIAN ASSISTANT

## 2020-02-21 PROCEDURE — 81002 URINALYSIS NONAUTO W/O SCOPE: CPT | Performed by: PHYSICIAN ASSISTANT

## 2020-02-21 RX ORDER — TAMSULOSIN HYDROCHLORIDE 0.4 MG/1
0.4 CAPSULE ORAL
Qty: 15 CAPSULE | Refills: 0 | Status: SHIPPED | OUTPATIENT
Start: 2020-02-21 | End: 2020-03-05

## 2020-02-21 RX ORDER — TAMSULOSIN HYDROCHLORIDE 0.4 MG/1
0.4 CAPSULE ORAL
Qty: 15 CAPSULE | Refills: 0 | Status: SHIPPED | OUTPATIENT
Start: 2020-02-21 | End: 2020-02-21 | Stop reason: SDUPTHER

## 2020-02-21 NOTE — PROGRESS NOTES
Assessment and plan:       1  Nephrolithiasis  - Patient was recently diagnosed with a 6 mm obstructing left ureteral stone at the UVJ  - She continues to be symptomatic and takes tamsulosin regularly as well as naproxen and morphine as needed for pain  - Urine continues to be positive for blood  Specimen will be sent for culture for preoperative purposes  - We discussed moving forward with both medical expulsive therapy as well as the option of ureteroscopy  It was recommended due to the patient's continued symptoms as well as the size of the stone that we schedule her for ureteroscopy  This will be canceled if she passes the stone in the interim  - Case request was placed for cystoscopy with left-sided ureteroscopy, holmium laser lithotripsy, retrograde pyelogram, and stent placement  Pre, marilyn, and postoperative course was discussed with the patient as well as surgical risks  - She will return in the near future for this procedure  Artemio Jean Baptiste PA-C      Chief Complaint     Chief Complaint   Patient presents with    Flank Pain    Nephrolithiasis         History of Present Illness     Vida Bernal is a 60 y o  female patient who recently established care with an outside urologist for history of renal stones  She was seen in the office on 01/20/2020 where a renal ultrasound was reviewed showing a 10 mm stone in the lower pole of the left kidney  She did endorse some epigastric and left flank pain as well as left back pain at that time  Due to her stone appearing to be nonobstructing, patient chose observation with follow-up in 6 months with this urologist     She presents to the emergency department on 02/18/2019 reporting waking up to left flank pain radiating to her left lower abdomen and groin  She reported feeling like she needed to void but was unable  CT scan was positive for a 6 mm obstructing stone at the left UVJ with severe left-sided hydroureteronephrosis      She continues to be symptomatic and urine is positive for blood  Laboratory     Lab Results   Component Value Date    CREATININE 1 05 02/18/2020       No results found for: PSA    Recent Results (from the past 1 hour(s))   POCT urine dip    Collection Time: 02/21/20  9:27 AM   Result Value Ref Range    LEUKOCYTE ESTERASE,UA trace     NITRITE,UA -     SL AMB POCT UROBILINOGEN -     POCT URINE PROTEIN trace      PH,UA 6 0     BLOOD,UA ++     SPECIFIC GRAVITY,UA 1 020     KETONES,UA -     BILIRUBIN,UA -     GLUCOSE, UA -      COLOR,UA yellow     CLARITY,UA clear          Review of Systems     Review of Systems   Constitutional: Negative for chills and fever  HENT: Negative  Eyes: Negative  Respiratory: Negative for shortness of breath  Cardiovascular: Negative for chest pain  Gastrointestinal: Positive for abdominal pain and constipation  Negative for diarrhea, nausea and vomiting  Genitourinary: Positive for flank pain  Negative for difficulty urinating, dysuria, enuresis, frequency, hematuria and urgency  Skin: Negative  Allergies     No Known Allergies    Physical Exam     Physical Exam   Constitutional: She is oriented to person, place, and time  She appears well-developed and well-nourished  No distress  HENT:   Head: Normocephalic and atraumatic  Right Ear: External ear normal    Left Ear: External ear normal    Nose: Nose normal    Eyes: Right eye exhibits no discharge  Left eye exhibits no discharge  No scleral icterus  Cardiovascular: Normal rate, regular rhythm, normal heart sounds and intact distal pulses  Exam reveals no gallop and no friction rub  No murmur heard  Pulmonary/Chest: Effort normal and breath sounds normal  No stridor  No respiratory distress  She has no wheezes  She has no rales  Abdominal: Soft  She exhibits no distension  There is no tenderness  There is no guarding     Genitourinary:   Genitourinary Comments: Negative for CVA tenderness bilaterally   Musculoskeletal:   Ambulates independently   Neurological: She is alert and oriented to person, place, and time  Skin: Skin is warm and dry  She is not diaphoretic  Psychiatric: She has a normal mood and affect  Her behavior is normal  Judgment and thought content normal    Nursing note and vitals reviewed          Vital Signs     Vitals:    02/21/20 0922   BP: 126/70   BP Location: Left arm   Patient Position: Sitting   Cuff Size: Standard   Pulse: 84   Weight: 84 4 kg (186 lb)   Height: 5' 2" (1 575 m)         Current Medications       Current Outpatient Medications:     amLODIPine-benazepril (LOTREL 5-20) 5-20 MG per capsule, Take 1 capsule by mouth every morning, Disp: , Rfl:     cyclobenzaprine (FLEXERIL) 5 mg tablet, Take 5 mg by mouth daily as needed, Disp: , Rfl:     gabapentin (NEURONTIN) 300 mg capsule, Take 300 mg by mouth 3 (three) times a day as needed  , Disp: , Rfl:     loratadine (CLARITIN) 10 mg tablet, Take 10 mg by mouth daily as needed, Disp: , Rfl:     morphine (MSIR) 15 mg tablet, Take 1 tablet (15 mg total) by mouth every 6 (six) hours as needed for severe pain for up to 7 daysMax Daily Amount: 60 mg, Disp: 11 tablet, Rfl: 0    naproxen (NAPROSYN) 500 mg tablet, Take 1 tablet (500 mg total) by mouth 2 (two) times a day as needed for moderate pain for up to 20 doses, Disp: 20 tablet, Rfl: 0    ondansetron (ZOFRAN-ODT) 4 mg disintegrating tablet, Take 1 tablet (4 mg total) by mouth every 8 (eight) hours as needed for nausea or vomiting, Disp: 10 tablet, Rfl: 0    tamsulosin (FLOMAX) 0 4 mg, Take 1 capsule (0 4 mg total) by mouth daily with dinner for 5 days, Disp: 5 capsule, Rfl: 0    triamcinolone (KENALOG) 0 1 % ointment, Apply topically daily as needed, Disp: , Rfl:       Active Problems     Patient Active Problem List   Diagnosis    Radiculopathy, lumbar region         Past Medical History     Past Medical History:   Diagnosis Date    Arthritis     Chronic low back pain     Hypercholesteremia     Hypertension     Kidney stone     Lumbar radiculopathy          Surgical History     Past Surgical History:   Procedure Laterality Date    LIPOMA RESECTION      stomach         Family History     Family History   Problem Relation Age of Onset    Heart disease Mother     Diabetes Mother     Hypertension Mother     No Known Problems Father     No Known Problems Sister     Diabetes Brother     Alzheimer's disease Maternal Grandmother          Social History     Social History       Radiology

## 2020-02-25 ENCOUNTER — ANESTHESIA EVENT (OUTPATIENT)
Dept: PERIOP | Facility: AMBULARY SURGERY CENTER | Age: 60
End: 2020-02-25
Payer: COMMERCIAL

## 2020-02-25 ENCOUNTER — PREP FOR PROCEDURE (OUTPATIENT)
Dept: UROLOGY | Facility: CLINIC | Age: 60
End: 2020-02-25

## 2020-02-25 ENCOUNTER — TELEPHONE (OUTPATIENT)
Dept: UROLOGY | Facility: CLINIC | Age: 60
End: 2020-02-25

## 2020-02-25 DIAGNOSIS — Z01.818 PRE-OP TESTING: ICD-10-CM

## 2020-02-25 DIAGNOSIS — N20.0 RENAL STONES: Primary | ICD-10-CM

## 2020-02-25 NOTE — TELEPHONE ENCOUNTER
I spoke with pt this afternoon to discuss getting her scheduled for a cysto/ L  RGP/ L  URS w/ litho/ L  Stent insertion with one of our providers  I offered her 3 dates including 3/6/20 at the [facility] with Dr Bharat Berg, 3/10/20 with Dr Markel Chin at the [facility] and 3/16/20 with Dr Nette Hopson at the [facility] (pt  Preferred)  Pt asked for sometime to think about it and she will call me back to schedule

## 2020-02-25 NOTE — TELEPHONE ENCOUNTER
Pt  Returned my phone call and stated that she spoke with her job and has decided to schedule this procedure at the [facility] on 3/10/20  I verbally went over all of pt 's pre op instructions and prep information with her  Pt is aware that she needs to have a urine culture and EKG done as soon as possible and will have them done at the [facility]  Per pt 's request I e-mailed her a copy of her surgical packet and instructed her to call me with any questions or concerns regarding this procedure

## 2020-02-27 RX ORDER — RANITIDINE 150 MG/1
150 CAPSULE ORAL AS NEEDED
COMMUNITY

## 2020-02-27 NOTE — PRE-PROCEDURE INSTRUCTIONS
Pre-Surgery Instructions:   Medication Instructions    amLODIPine-benazepril (LOTREL 5-20) 5-20 MG per capsule Instructed patient per Anesthesia Guidelines   cyclobenzaprine (FLEXERIL) 5 mg tablet Instructed patient per Anesthesia Guidelines   gabapentin (NEURONTIN) 300 mg capsule Instructed patient per Anesthesia Guidelines   loratadine (CLARITIN) 10 mg tablet Instructed patient per Anesthesia Guidelines   naproxen (NAPROSYN) 500 mg tablet Patient was instructed by Physician and understands   ondansetron (ZOFRAN-ODT) 4 mg disintegrating tablet Instructed patient per Anesthesia Guidelines   ranitidine (ZANTAC) 150 MG capsule Instructed patient per Anesthesia Guidelines   tamsulosin (FLOMAX) 0 4 mg Instructed patient per Anesthesia Guidelines   triamcinolone (KENALOG) 0 1 % ointment Instructed patient per Anesthesia Guidelines  Pre op and bathing instructions reviewed

## 2020-03-02 ENCOUNTER — OFFICE VISIT (OUTPATIENT)
Dept: LAB | Facility: HOSPITAL | Age: 60
End: 2020-03-02
Attending: UROLOGY
Payer: COMMERCIAL

## 2020-03-02 ENCOUNTER — APPOINTMENT (OUTPATIENT)
Dept: LAB | Facility: HOSPITAL | Age: 60
End: 2020-03-02
Attending: UROLOGY
Payer: COMMERCIAL

## 2020-03-02 DIAGNOSIS — N20.0 RENAL STONES: ICD-10-CM

## 2020-03-02 DIAGNOSIS — Z01.818 PRE-OP TESTING: ICD-10-CM

## 2020-03-02 LAB
ATRIAL RATE: 64 BPM
P AXIS: 47 DEGREES
PR INTERVAL: 146 MS
QRS AXIS: -10 DEGREES
QRSD INTERVAL: 74 MS
QT INTERVAL: 392 MS
QTC INTERVAL: 404 MS
T WAVE AXIS: 11 DEGREES
VENTRICULAR RATE: 64 BPM

## 2020-03-02 PROCEDURE — 93010 ELECTROCARDIOGRAM REPORT: CPT | Performed by: INTERNAL MEDICINE

## 2020-03-02 PROCEDURE — 87086 URINE CULTURE/COLONY COUNT: CPT

## 2020-03-02 PROCEDURE — 93005 ELECTROCARDIOGRAM TRACING: CPT

## 2020-03-03 LAB — BACTERIA UR CULT: NORMAL

## 2020-03-05 DIAGNOSIS — N20.0 KIDNEY STONE ON LEFT SIDE: ICD-10-CM

## 2020-03-05 RX ORDER — TAMSULOSIN HYDROCHLORIDE 0.4 MG/1
CAPSULE ORAL
Qty: 15 CAPSULE | Refills: 0 | Status: SHIPPED | OUTPATIENT
Start: 2020-03-05 | End: 2020-03-10 | Stop reason: HOSPADM

## 2020-03-10 ENCOUNTER — ANESTHESIA (OUTPATIENT)
Dept: PERIOP | Facility: AMBULARY SURGERY CENTER | Age: 60
End: 2020-03-10
Payer: COMMERCIAL

## 2020-03-10 ENCOUNTER — HOSPITAL ENCOUNTER (OUTPATIENT)
Facility: AMBULARY SURGERY CENTER | Age: 60
Setting detail: OUTPATIENT SURGERY
Discharge: HOME | End: 2020-03-10
Attending: UROLOGY | Admitting: UROLOGY
Payer: COMMERCIAL

## 2020-03-10 ENCOUNTER — APPOINTMENT (OUTPATIENT)
Dept: RADIOLOGY | Facility: AMBULARY SURGERY CENTER | Age: 60
End: 2020-03-10
Payer: COMMERCIAL

## 2020-03-10 VITALS
HEIGHT: 62 IN | TEMPERATURE: 97.3 F | OXYGEN SATURATION: 94 % | HEART RATE: 62 BPM | DIASTOLIC BLOOD PRESSURE: 70 MMHG | RESPIRATION RATE: 18 BRPM | SYSTOLIC BLOOD PRESSURE: 128 MMHG | BODY MASS INDEX: 33.31 KG/M2 | WEIGHT: 181 LBS

## 2020-03-10 DIAGNOSIS — N20.0 RENAL STONES: ICD-10-CM

## 2020-03-10 PROCEDURE — C1769 GUIDE WIRE: HCPCS | Performed by: UROLOGY

## 2020-03-10 PROCEDURE — NC001 PR NO CHARGE: Performed by: UROLOGY

## 2020-03-10 PROCEDURE — 52356 CYSTO/URETERO W/LITHOTRIPSY: CPT | Performed by: UROLOGY

## 2020-03-10 PROCEDURE — 82360 CALCULUS ASSAY QUANT: CPT | Performed by: UROLOGY

## 2020-03-10 PROCEDURE — 74420 UROGRAPHY RTRGR +-KUB: CPT

## 2020-03-10 PROCEDURE — C1758 CATHETER, URETERAL: HCPCS | Performed by: UROLOGY

## 2020-03-10 PROCEDURE — C2617 STENT, NON-COR, TEM W/O DEL: HCPCS | Performed by: UROLOGY

## 2020-03-10 DEVICE — STENT URETERAL 4.7FR 26CM INLAY OPTIMA: Type: IMPLANTABLE DEVICE | Site: URETER | Status: FUNCTIONAL

## 2020-03-10 RX ORDER — CEFAZOLIN SODIUM 2 G/50ML
2000 SOLUTION INTRAVENOUS ONCE
Status: COMPLETED | OUTPATIENT
Start: 2020-03-10 | End: 2020-03-10

## 2020-03-10 RX ORDER — SODIUM CHLORIDE, SODIUM LACTATE, POTASSIUM CHLORIDE, CALCIUM CHLORIDE 600; 310; 30; 20 MG/100ML; MG/100ML; MG/100ML; MG/100ML
INJECTION, SOLUTION INTRAVENOUS CONTINUOUS PRN
Status: DISCONTINUED | OUTPATIENT
Start: 2020-03-10 | End: 2020-03-10 | Stop reason: SURG

## 2020-03-10 RX ORDER — CIPROFLOXACIN 250 MG/1
250 TABLET, FILM COATED ORAL EVERY 12 HOURS SCHEDULED
Qty: 10 TABLET | Refills: 0 | Status: SHIPPED | OUTPATIENT
Start: 2020-03-10 | End: 2020-03-15

## 2020-03-10 RX ORDER — SODIUM CHLORIDE, SODIUM LACTATE, POTASSIUM CHLORIDE, CALCIUM CHLORIDE 600; 310; 30; 20 MG/100ML; MG/100ML; MG/100ML; MG/100ML
20 INJECTION, SOLUTION INTRAVENOUS CONTINUOUS
Status: DISCONTINUED | OUTPATIENT
Start: 2020-03-10 | End: 2020-03-10 | Stop reason: HOSPADM

## 2020-03-10 RX ORDER — LIDOCAINE HYDROCHLORIDE 10 MG/ML
INJECTION, SOLUTION EPIDURAL; INFILTRATION; INTRACAUDAL; PERINEURAL AS NEEDED
Status: DISCONTINUED | OUTPATIENT
Start: 2020-03-10 | End: 2020-03-10 | Stop reason: SURG

## 2020-03-10 RX ORDER — OXYBUTYNIN CHLORIDE 5 MG/1
5 TABLET ORAL 4 TIMES DAILY PRN
Status: DISCONTINUED | OUTPATIENT
Start: 2020-03-10 | End: 2020-03-10 | Stop reason: HOSPADM

## 2020-03-10 RX ORDER — PROPOFOL 10 MG/ML
INJECTION, EMULSION INTRAVENOUS AS NEEDED
Status: DISCONTINUED | OUTPATIENT
Start: 2020-03-10 | End: 2020-03-10 | Stop reason: SURG

## 2020-03-10 RX ORDER — OXYCODONE HYDROCHLORIDE AND ACETAMINOPHEN 5; 325 MG/1; MG/1
1 TABLET ORAL EVERY 4 HOURS PRN
Status: DISCONTINUED | OUTPATIENT
Start: 2020-03-10 | End: 2020-03-10 | Stop reason: HOSPADM

## 2020-03-10 RX ORDER — ONDANSETRON 2 MG/ML
4 INJECTION INTRAMUSCULAR; INTRAVENOUS ONCE AS NEEDED
Status: DISCONTINUED | OUTPATIENT
Start: 2020-03-10 | End: 2020-03-10 | Stop reason: HOSPADM

## 2020-03-10 RX ORDER — OXYBUTYNIN CHLORIDE 5 MG/1
5 TABLET ORAL 3 TIMES DAILY PRN
Qty: 20 TABLET | Refills: 0 | Status: SHIPPED | OUTPATIENT
Start: 2020-03-10

## 2020-03-10 RX ORDER — FENTANYL CITRATE 50 UG/ML
INJECTION, SOLUTION INTRAMUSCULAR; INTRAVENOUS AS NEEDED
Status: DISCONTINUED | OUTPATIENT
Start: 2020-03-10 | End: 2020-03-10 | Stop reason: SURG

## 2020-03-10 RX ORDER — ACETAMINOPHEN 325 MG/1
650 TABLET ORAL EVERY 6 HOURS PRN
Status: DISCONTINUED | OUTPATIENT
Start: 2020-03-10 | End: 2020-03-10 | Stop reason: HOSPADM

## 2020-03-10 RX ORDER — MAGNESIUM HYDROXIDE 1200 MG/15ML
LIQUID ORAL AS NEEDED
Status: DISCONTINUED | OUTPATIENT
Start: 2020-03-10 | End: 2020-03-10 | Stop reason: HOSPADM

## 2020-03-10 RX ORDER — FENTANYL CITRATE/PF 50 MCG/ML
25 SYRINGE (ML) INJECTION
Status: COMPLETED | OUTPATIENT
Start: 2020-03-10 | End: 2020-03-10

## 2020-03-10 RX ORDER — SODIUM CHLORIDE, SODIUM LACTATE, POTASSIUM CHLORIDE, CALCIUM CHLORIDE 600; 310; 30; 20 MG/100ML; MG/100ML; MG/100ML; MG/100ML
125 INJECTION, SOLUTION INTRAVENOUS CONTINUOUS
Status: DISCONTINUED | OUTPATIENT
Start: 2020-03-10 | End: 2020-03-10 | Stop reason: HOSPADM

## 2020-03-10 RX ORDER — OXYCODONE HYDROCHLORIDE 5 MG/1
5 TABLET ORAL EVERY 6 HOURS PRN
Qty: 20 TABLET | Refills: 0 | Status: SHIPPED | OUTPATIENT
Start: 2020-03-10 | End: 2020-03-17

## 2020-03-10 RX ADMIN — FENTANYL CITRATE 25 MCG: 0.05 INJECTION, SOLUTION INTRAMUSCULAR; INTRAVENOUS at 09:08

## 2020-03-10 RX ADMIN — FENTANYL CITRATE 25 MCG: 0.05 INJECTION, SOLUTION INTRAMUSCULAR; INTRAVENOUS at 08:46

## 2020-03-10 RX ADMIN — FENTANYL CITRATE 50 MCG: 50 INJECTION, SOLUTION INTRAMUSCULAR; INTRAVENOUS at 08:18

## 2020-03-10 RX ADMIN — SODIUM CHLORIDE, SODIUM LACTATE, POTASSIUM CHLORIDE, AND CALCIUM CHLORIDE: .6; .31; .03; .02 INJECTION, SOLUTION INTRAVENOUS at 07:15

## 2020-03-10 RX ADMIN — FENTANYL CITRATE 25 MCG: 0.05 INJECTION, SOLUTION INTRAMUSCULAR; INTRAVENOUS at 09:02

## 2020-03-10 RX ADMIN — FENTANYL CITRATE 25 MCG: 0.05 INJECTION, SOLUTION INTRAMUSCULAR; INTRAVENOUS at 08:53

## 2020-03-10 RX ADMIN — PROPOFOL 200 MG: 10 INJECTION, EMULSION INTRAVENOUS at 07:41

## 2020-03-10 RX ADMIN — FENTANYL CITRATE 50 MCG: 50 INJECTION, SOLUTION INTRAMUSCULAR; INTRAVENOUS at 07:50

## 2020-03-10 RX ADMIN — CEFAZOLIN SODIUM 2000 MG: 2 SOLUTION INTRAVENOUS at 07:30

## 2020-03-10 RX ADMIN — LIDOCAINE HYDROCHLORIDE 100 MG: 10 INJECTION, SOLUTION EPIDURAL; INFILTRATION; INTRACAUDAL; PERINEURAL at 07:41

## 2020-03-10 NOTE — H&P
Nephrolithiasis- left ureteral stone  - Patient was recently diagnosed with a 6 mm obstructing left ureteral stone at the UVJ  - She continues to be symptomatic and takes tamsulosin regularly as well as naproxen and morphine as needed for pain  - For cystoscopy with left-sided ureteroscopy, holmium laser lithotripsy, retrograde pyelogram, and stent placement  Pre, marilyn, and postoperative course was discussed with the patient as well as surgical risks          Chief Complaint          Chief Complaint   Patient presents with    Flank Pain    Nephrolithiasis            History of Present Illness      Eva Lozano is a 60 y o  female patient who recently established care with an outside urologist for history of renal stones  She was seen in the office on 01/20/2020 where a renal ultrasound was reviewed showing a 10 mm stone in the lower pole of the left kidney  She did endorse some epigastric and left flank pain as well as left back pain at that time  Due to her stone appearing to be nonobstructing, patient chose observation with follow-up in 6 months with this urologist      She presents to the emergency department on 02/18/2019 reporting waking up to left flank pain radiating to her left lower abdomen and groin  She reported feeling like she needed to void but was unable    CT scan was positive for a 6 mm obstructing stone at the left UVJ with severe left-sided hydroureteronephrosis      She continues to be symptomatic and urine is positive for blood            Laboratory            Lab Results   Component Value Date     CREATININE 1 05 02/18/2020         No results found for: PSA     Recent Results         Recent Results (from the past 1 hour(s))   POCT urine dip     Collection Time: 02/21/20  9:27 AM   Result Value Ref Range     LEUKOCYTE ESTERASE,UA trace       NITRITE,UA -       SL AMB POCT UROBILINOGEN -       POCT URINE PROTEIN trace        PH,UA 6 0       BLOOD,UA ++       SPECIFIC GRAVITY,UA 1 020       KETONES,UA -       BILIRUBIN,UA -       GLUCOSE, UA -        COLOR,UA yellow       CLARITY,UA clear                 Review of Systems      Review of Systems   Constitutional: Negative for chills and fever  HENT: Negative  Eyes: Negative  Respiratory: Negative for shortness of breath  Cardiovascular: Negative for chest pain  Gastrointestinal: Positive for abdominal pain and constipation  Negative for diarrhea, nausea and vomiting  Genitourinary: Positive for flank pain  Negative for difficulty urinating, dysuria, enuresis, frequency, hematuria and urgency  Skin: Negative                          Allergies      No Known Allergies     Physical Exam      Physical Exam   Constitutional: She is oriented to person, place, and time  She appears well-developed and well-nourished  No distress  HENT:   Head: Normocephalic and atraumatic  Right Ear: External ear normal    Left Ear: External ear normal    Nose: Nose normal    Eyes: Right eye exhibits no discharge  Left eye exhibits no discharge  No scleral icterus  Cardiovascular: Normal rate, regular rhythm, normal heart sounds and intact distal pulses  Exam reveals no gallop and no friction rub  No murmur heard  Pulmonary/Chest: Effort normal and breath sounds normal  No stridor  No respiratory distress  She has no wheezes  She has no rales  Abdominal: Soft  She exhibits no distension  There is no tenderness  There is no guarding  Genitourinary:   Genitourinary Comments: Negative for CVA tenderness bilaterally   Musculoskeletal:   Ambulates independently   Neurological: She is alert and oriented to person, place, and time  Skin: Skin is warm and dry  She is not diaphoretic  Psychiatric: She has a normal mood and affect   Her behavior is normal  Judgment and thought content normal    Nursing note and vitals reviewed            Vital Signs      Vitals       Vitals:     02/21/20 0922   BP: 126/70   BP Location: Left arm   Patient Position: Sitting   Cuff Size: Standard   Pulse: 84   Weight: 84 4 kg (186 lb)   Height: 5' 2" (1 575 m)               Current Medications         Current Outpatient Medications:     amLODIPine-benazepril (LOTREL 5-20) 5-20 MG per capsule, Take 1 capsule by mouth every morning, Disp: , Rfl:     cyclobenzaprine (FLEXERIL) 5 mg tablet, Take 5 mg by mouth daily as needed, Disp: , Rfl:     gabapentin (NEURONTIN) 300 mg capsule, Take 300 mg by mouth 3 (three) times a day as needed  , Disp: , Rfl:     loratadine (CLARITIN) 10 mg tablet, Take 10 mg by mouth daily as needed, Disp: , Rfl:     morphine (MSIR) 15 mg tablet, Take 1 tablet (15 mg total) by mouth every 6 (six) hours as needed for severe pain for up to 7 daysMax Daily Amount: 60 mg, Disp: 11 tablet, Rfl: 0    naproxen (NAPROSYN) 500 mg tablet, Take 1 tablet (500 mg total) by mouth 2 (two) times a day as needed for moderate pain for up to 20 doses, Disp: 20 tablet, Rfl: 0    ondansetron (ZOFRAN-ODT) 4 mg disintegrating tablet, Take 1 tablet (4 mg total) by mouth every 8 (eight) hours as needed for nausea or vomiting, Disp: 10 tablet, Rfl: 0    tamsulosin (FLOMAX) 0 4 mg, Take 1 capsule (0 4 mg total) by mouth daily with dinner for 5 days, Disp: 5 capsule, Rfl: 0    triamcinolone (KENALOG) 0 1 % ointment, Apply topically daily as needed, Disp: , Rfl:         Active Problems          Patient Active Problem List   Diagnosis    Radiculopathy, lumbar region            Past Medical History      Medical History        Past Medical History:   Diagnosis Date    Arthritis      Chronic low back pain      Hypercholesteremia      Hypertension      Kidney stone      Lumbar radiculopathy                 Surgical History      Surgical History         Past Surgical History:   Procedure Laterality Date    LIPOMA RESECTION         stomach               Family History            Family History   Problem Relation Age of Onset    Heart disease Mother      Diabetes Mother      Hypertension Mother      No Known Problems Father      No Known Problems Sister      Diabetes Brother      Alzheimer's disease Maternal Grandmother

## 2020-03-10 NOTE — ANESTHESIA POSTPROCEDURE EVALUATION
Post-Op Assessment Note    CV Status:  Stable  Pain Score: 0    Pain management: adequate     Mental Status:  Sleepy   Hydration Status:  Euvolemic   PONV Controlled:  Controlled   Airway Patency:  Patent   Post Op Vitals Reviewed: Yes      Staff: CRNA   Comments: vss sv nonobstructed uneventful          BP (P) 140/70 (03/10/20 0830)    Temp (!) (P) 97 2 °F (36 2 °C) (03/10/20 0830)    Pulse (P) 63 (03/10/20 0830)   Resp (P) 20 (03/10/20 0830)    SpO2 (P) 100 % (03/10/20 0830)

## 2020-03-10 NOTE — DISCHARGE INSTRUCTIONS
Ureteral Stent Placement   WHAT YOU NEED TO KNOW:   Ureteral stent placement is a procedure to open a blocked or narrow ureter  The ureter is the tube that carries urine from your kidney into your bladder  A stent is a thin hollow plastic tube used to hold your ureter open and allow urine to flow  The stent may stay in for several weeks  DISCHARGE INSTRUCTIONS:   Medicines:   · Pain medicine  may be given to take away or decrease pain  Do not wait until the pain is severe before you take your medicine  · Antibiotics  help prevent infections  Your healthcare provider may prescribe these for you while your stent remains in  · Take your medicine as directed  Contact your healthcare provider if you think your medicine is not helping or if you have side effects  Tell him or her if you are allergic to any medicine  Keep a list of the medicines, vitamins, and herbs you take  Include the amounts, and when and why you take them  Bring the list or the pill bottles to follow-up visits  Carry your medicine list with you in case of an emergency  Follow up with your urologist as directed: You will need regular follow-up visits with your urologist as long as the stent remains in  He will check to make sure the stent is working properly  He may do urine cultures to check for infection  Write down your questions so you remember to ask them during your visits  Self-care:   · Drink liquids  as directed  Ask your healthcare provider how much liquid to drink each day and which liquids are best for you  Fluids such as cranberry or apple juice may be especially helpful to prevent urinary infections  · Return to normal activities  the day after your stent placement or as directed by your healthcare provider  · You may take a shower  the day after your stent placement if your healthcare provider says it is okay  Contact your healthcare provider or urologist if:   · You have a fever or chills      · You feel like you need to urinate often  · You have pain when you urinate or pain around your bladder or kidney  · You see blood in your urine or it looks cloudy  · You have questions or concerns about your condition or care  Seek care immediately or call 911 if:   · You urinate little or not at all  · You have severe pain in your abdomen  © 2017 [employer] Information is for End User's use only and may not be sold, redistributed or otherwise used for commercial purposes  All illustrations and images included in CareNotes® are the copyrighted property of [employer]  or [employer] Irving  The above information is an  only  It is not intended as medical advice for individual conditions or treatments  Talk to your doctor, nurse or pharmacist before following any medical regimen to see if it is safe and effective for you

## 2020-03-10 NOTE — ANESTHESIA PREPROCEDURE EVALUATION
Review of Systems/Medical History  Patient summary reviewed        Cardiovascular  Hyperlipidemia, Hypertension ,    Pulmonary    Comment: Seasonal allergies     GI/Hepatic    GERD well controlled,        Kidney stones,        Endo/Other    Obesity    GYN  Negative gynecology ROS          Hematology  Negative hematology ROS      Musculoskeletal  Back pain , lumbar pain,   Arthritis     Neurology  Negative neurology ROS      Psychology   Negative psychology ROS              Physical Exam    Airway    Mallampati score: III  TM Distance: >3 FB  Neck ROM: full     Dental   No notable dental hx     Cardiovascular      Pulmonary      Other Findings        Anesthesia Plan  ASA Score- 2     Anesthesia Type- general with ASA Monitors  Additional Monitors:   Airway Plan: LMA  Plan Factors-    Induction- intravenous  Postoperative Plan- Plan for postoperative opioid use  Informed Consent- Anesthetic plan and risks discussed with patient  I personally reviewed this patient with the CRNA  Discussed and agreed on the Anesthesia Plan with the CRNA  Madelyn Villarreal

## 2020-03-10 NOTE — OP NOTE
OPERATIVE REPORT  PATIENT NAME: Germain Wright    :  1960  MRN: [id number]  Pt Location: AN  OR ROOM 04    SURGERY DATE: 3/10/2020    Surgeon(s) and Role:     * Irene Beasley MD - Primary    Preop Diagnosis:  Renal stones [N20 0]    Post-Op Diagnosis Codes:     * Renal stones [N20 0]    Procedure(s) (LRB):  CYSTOSCOPY URETEROSCOPY WITH LITHOTRIPSY HOLMIUM LASER, RETROGRADE PYELOGRAM AND INSERTION STENT URETERAL (Left)    Specimen(s):  ID Type Source Tests Collected by Time Destination   A : LEFT URETERAL STONE Calculus Ureter, Left STONE ANALYSIS Irene Beasley MD 3/10/2020 7972        Estimated Blood Loss:   Minimal    Drains:  4 8 Mexican x 26 cm inlay JJ stent    Anesthesia Type:   General    Operative Indications:  Left ureteral stone    Operative Findings:  Impacted left ureteral stone at the UVJ    Complications:   None    Procedure and Technique:  PLAN FOR STENT:  Internalized without string  Will need to be removed in the office cystoscopically in 1 week  The patient was brought into the OR, properly identified and positioned on the table  General anesthesia was induced, and she was prepped using betadine solution and draped in lithotomy position  The 22F scope was introduced in the urethra, which showed no strictures  The bladder was inspected and had no lesions, stones, or tumors  The left ureteral orifice was identified and a retro catheter was positioned at the orifice  A retrograde pyelogram was performed that demonstrated a high-grade obstruction at the UVJ  A flexible tipped guide wire placed up the ureter  The semi-rigid ureteroscope was introduced and carefully advanced up to the stone, which was impacted in the distal UVJ portion of the ureter  The Holmium laser fiber was introduced thru the scope and advanced to the edge of the stone  Using various laser settings, stone lasered into numerous fragments  Care was taken not to laser tissue    A basket was placed thru the scope and carefully engaged the largest stone fragments  The scope was withdrawn, bringing the stones out, with no trauma to the ureter  All of the remaining fragments were judged small enough to pass around the stent  The semi-rigid ureteroscope was removed from the ureter, and the cystoscope was used to place a 4 8F x 26 cm double-J inlay stent in the ureter, with the upper coils in the renal pelvis, and the distal coil in the bladder  Retrograde pyelogram on that side confirmed good position and no extravasation of contrast      The patient was awaken from anesthesia and taken to the PACU in good condition        Patient Disposition:  PACU     SIGNATURE: Bry Parnell MD  DATE: March 10, 2020  TIME: 8:31 AM

## 2020-03-11 ENCOUNTER — TELEPHONE (OUTPATIENT)
Dept: UROLOGY | Facility: AMBULATORY SURGERY CENTER | Age: 60
End: 2020-03-11

## 2020-03-11 NOTE — TELEPHONE ENCOUNTER
Called patient - appointment made for 03/17/20 @ 11:30 with Nando Denson in [facility]  Patient is a  and will try to go back to work on Friday

## 2020-03-11 NOTE — TELEPHONE ENCOUNTER
Patient managed by Eulogio Armando is calling for instructions on when to go back to work    She also needs to set up stent removal  [phone]

## 2020-03-17 ENCOUNTER — TELEPHONE (OUTPATIENT)
Dept: UROLOGY | Facility: CLINIC | Age: 60
End: 2020-03-17

## 2020-03-17 NOTE — TELEPHONE ENCOUNTER
Pt called upset that she received message this morning that her appointment cysto stent removal was canceled as she had taken off work and has to travel for appointments she's asking when and where can she be seen?

## 2020-03-17 NOTE — TELEPHONE ENCOUNTER
I spoke with patient, and made an appointment for her stent to be removed by Robin Butcher PA-C at the [facility] office at 2:00  The office address and phone number were provided  Patient verbalized understanding

## 2020-03-17 NOTE — TELEPHONE ENCOUNTER
A message was left informing patient that our office is closed  I stated that we will give her a call back to reschedule her appointment  Our call back number was left in the message for any further questions or concerns

## 2020-03-17 NOTE — TELEPHONE ENCOUNTER
Unfortunately the [facility] office will be closed for the coming 2 weeks  Patient can be scheduled in [place] for a cysto stent removal with myself or Eduardo Ballesteros

## 2020-03-26 LAB
CA CARBONATE CRY STONE QL IR: 2 %
CALCIUM OXALATE DIHYDRATE MFR STONE IR: 14 %
COLOR STONE: NORMAL
COM MFR STONE: 82 %
COMMENT-STONE3: NORMAL
COMPOSITION: NORMAL
HYDROXYAPATITE 24H ENGDIFF UR: 2 %
LABORATORY COMMENT REPORT: NORMAL
PHOTO: NORMAL
SIZE STONE: NORMAL MM
SPEC SOURCE SUBJ: NORMAL
STONE ANALYSIS-IMP: NORMAL
STONE ANALYSIS-IMP: NORMAL
WT STONE: 46.1 MG

## 2020-03-26 NOTE — TELEPHONE ENCOUNTER
Patient asking to come in Monday - Wednesday next week  Returning to work on Thursday      She can be reached at [phone] or [phone]

## 2020-03-26 NOTE — TELEPHONE ENCOUNTER
Called and spoke with patient at this time  She was rescheduled for 3/31/20 at 1pm in the [facility] office with Dr Brady Agent

## 2020-03-30 NOTE — PATIENT INSTRUCTIONS

## 2020-03-30 NOTE — PROGRESS NOTES
Office Cystoscopy Procedure Note    Indication:     Encounter for removal of ureteral stent, left    Informed consent   The risks, benefits, complications, treatment options, and expected outcomes were discussed with the patient  The patient concurred with the proposed plan and provided informed consent  Anesthesia  Lidocaine jelly 2%    Antibiotic prophylaxis   Ciprofloxacin 500 mg PO    Procedure  In the presence of a female nurse, the patient was placed in the supine frog-legged position, was prepped and draped in the usual manner using sterile technique, and 2% lidocaine jelly instilled into the urethra  Prior to this pelvic examination showed normal labia majora and minora, a small vaginal introitus, moderate quality vaginal mucosa, and showed no pelvic floor descensus and no urethral hypermobility  Upon stress maneuvers there was no stress incontinence  A 17 F flexible cystoscope was then inserted into the urethra and the urethra and bladder carefully examined  The following findings were noted:    Findings:  Urethra:  Normal  Bladder:  Normal, no stones, lesions, tumors, or defects  Ureteral orifices:  Normal, ureteral stent was grasped with a flexible grasper and removed without issue  Other findings:  None     Specimens: None                 Complications:    None; patient tolerated the procedure well           Disposition: To home            Condition: Stable    Plan: Patient is now stone free and stent free    Follow-up in 3 months with a KUB and renal ultrasound, if doing well at that time she can see us on a p r n  Basis      Cystoscopy  Date/Time: 3/31/2020 1:12 PM  Performed by: Jann Tucker MD  Authorized by: Jann Tucker MD     Procedure details: simple removal of a foreign body, stone, or stent    Patient tolerance: Patient tolerated the procedure well with no immediate complications    Additional Procedure Details: Office Cystoscopy Procedure Note    Indication:     Encounter for removal of ureteral stent, left    Informed consent   The risks, benefits, complications, treatment options, and expected outcomes were discussed with the patient  The patient concurred with the proposed plan and provided informed consent  Anesthesia  Lidocaine jelly 2%    Antibiotic prophylaxis   Ciprofloxacin 500 mg PO    Procedure  In the presence of a female nurse, the patient was placed in the supine frog-legged position, was prepped and draped in the usual manner using sterile technique, and 2% lidocaine jelly instilled into the urethra  Prior to this pelvic examination showed normal labia majora and minora, a small vaginal introitus, moderate quality vaginal mucosa, and showed no pelvic floor descensus and no urethral hypermobility  Upon stress maneuvers there was no stress incontinence  A 17 F flexible cystoscope was then inserted into the urethra and the urethra and bladder carefully examined  The following findings were noted:    Findings:  Urethra:  Normal  Bladder:  Normal, no stones, lesions, tumors, or defects  Ureteral orifices:  Normal, ureteral stent was grasped with a flexible grasper and removed without issue  Other findings:  None     Specimens: None                 Complications:    None; patient tolerated the procedure well           Disposition: To home            Condition: Stable    Plan: Patient is now stone free and stent free    Follow-up in 3 months with a KUB and renal ultrasound, if doing well at that time she can see us on a p r n  Basis

## 2020-03-31 ENCOUNTER — PROCEDURE VISIT (OUTPATIENT)
Dept: UROLOGY | Facility: CLINIC | Age: 60
End: 2020-03-31
Payer: COMMERCIAL

## 2020-03-31 VITALS
BODY MASS INDEX: 33.31 KG/M2 | HEIGHT: 62 IN | SYSTOLIC BLOOD PRESSURE: 136 MMHG | WEIGHT: 181 LBS | HEART RATE: 72 BPM | DIASTOLIC BLOOD PRESSURE: 78 MMHG

## 2020-03-31 DIAGNOSIS — Z46.6 ENCOUNTER FOR REMOVAL OF URETERAL STENT: ICD-10-CM

## 2020-03-31 DIAGNOSIS — N20.0 NEPHROLITHIASIS: Primary | ICD-10-CM

## 2020-03-31 LAB
SL AMB  POCT GLUCOSE, UA: NORMAL
SL AMB LEUKOCYTE ESTERASE,UA: NORMAL
SL AMB POCT BILIRUBIN,UA: NORMAL
SL AMB POCT BLOOD,UA: NORMAL
SL AMB POCT CLARITY,UA: NORMAL
SL AMB POCT COLOR,UA: YELLOW
SL AMB POCT KETONES,UA: NORMAL
SL AMB POCT NITRITE,UA: NORMAL
SL AMB POCT PH,UA: 6
SL AMB POCT SPECIFIC GRAVITY,UA: 1.02
SL AMB POCT URINE PROTEIN: NORMAL
SL AMB POCT UROBILINOGEN: 0.2

## 2020-03-31 PROCEDURE — 52310 CYSTOSCOPY AND TREATMENT: CPT | Performed by: UROLOGY

## 2020-03-31 PROCEDURE — 81002 URINALYSIS NONAUTO W/O SCOPE: CPT | Performed by: UROLOGY

## 2020-03-31 RX ORDER — MELOXICAM 15 MG/1
TABLET ORAL DAILY
COMMUNITY

## 2020-03-31 RX ORDER — MELOXICAM 15 MG/1
TABLET ORAL
COMMUNITY
Start: 2020-01-21

## 2020-03-31 NOTE — LETTER
March 31, 2020     Timmy Holcomb MD  [facility] [address] [facility] [address]    Patient: Koby Fnog   YOB: 1960   Date of Visit: 3/31/2020       Dear Dr Jose Walter: Thank you for referring Tyresenirmal Boo to me for evaluation  Below are my notes for this consultation  If you have questions, please do not hesitate to call me  I look forward to following your patient along with you  Sincerely,        Stuart Byrd MD        CC: MD Stuart Lancaster MD  3/31/2020  1:13 PM  Sign at close encounter  Office Cystoscopy Procedure Note    Indication:     Encounter for removal of ureteral stent, left    Informed consent   The risks, benefits, complications, treatment options, and expected outcomes were discussed with the patient  The patient concurred with the proposed plan and provided informed consent  Anesthesia  Lidocaine jelly 2%    Antibiotic prophylaxis   Ciprofloxacin 500 mg PO    Procedure  In the presence of a female nurse, the patient was placed in the supine frog-legged position, was prepped and draped in the usual manner using sterile technique, and 2% lidocaine jelly instilled into the urethra  Prior to this pelvic examination showed normal labia majora and minora, a small vaginal introitus, moderate quality vaginal mucosa, and showed no pelvic floor descensus and no urethral hypermobility  Upon stress maneuvers there was no stress incontinence  A 17 F flexible cystoscope was then inserted into the urethra and the urethra and bladder carefully examined    The following findings were noted:    Findings:  Urethra:  Normal  Bladder:  Normal, no stones, lesions, tumors, or defects  Ureteral orifices:  Normal, ureteral stent was grasped with a flexible grasper and removed without issue  Other findings:  None     Specimens: None                 Complications:    None; patient tolerated the procedure well           Disposition: To home Condition: Stable    Plan: Patient is now stone free and stent free    Follow-up in 3 months with a KUB and renal ultrasound, if doing well at that time she can see us on a p r n  Basis  Cystoscopy  Date/Time: 3/31/2020 1:12 PM  Performed by: Denise Beverly MD  Authorized by: Denise Beverly MD     Procedure details: simple removal of a foreign body, stone, or stent    Patient tolerance: Patient tolerated the procedure well with no immediate complications    Additional Procedure Details: Office Cystoscopy Procedure Note    Indication:     Encounter for removal of ureteral stent, left    Informed consent   The risks, benefits, complications, treatment options, and expected outcomes were discussed with the patient  The patient concurred with the proposed plan and provided informed consent  Anesthesia  Lidocaine jelly 2%    Antibiotic prophylaxis   Ciprofloxacin 500 mg PO    Procedure  In the presence of a female nurse, the patient was placed in the supine frog-legged position, was prepped and draped in the usual manner using sterile technique, and 2% lidocaine jelly instilled into the urethra  Prior to this pelvic examination showed normal labia majora and minora, a small vaginal introitus, moderate quality vaginal mucosa, and showed no pelvic floor descensus and no urethral hypermobility  Upon stress maneuvers there was no stress incontinence  A 17 F flexible cystoscope was then inserted into the urethra and the urethra and bladder carefully examined    The following findings were noted:    Findings:  Urethra:  Normal  Bladder:  Normal, no stones, lesions, tumors, or defects  Ureteral orifices:  Normal, ureteral stent was grasped with a flexible grasper and removed without issue  Other findings:  None     Specimens: None                 Complications:    None; patient tolerated the procedure well           Disposition: To home            Condition: Stable    Plan: Patient is now stone free and stent free    Follow-up in 3 months with a KUB and renal ultrasound, if doing well at that time she can see us on a p r n  Basis

## 2020-06-24 ENCOUNTER — HOSPITAL ENCOUNTER (OUTPATIENT)
Dept: ULTRASOUND IMAGING | Facility: HOSPITAL | Age: 60
Discharge: HOME | End: 2020-06-24
Attending: UROLOGY
Payer: COMMERCIAL

## 2020-06-24 DIAGNOSIS — N20.0 NEPHROLITHIASIS: ICD-10-CM

## 2020-06-24 PROCEDURE — 76770 US EXAM ABDO BACK WALL COMP: CPT

## 2020-07-08 ENCOUNTER — TELEMEDICINE (OUTPATIENT)
Dept: UROLOGY | Facility: CLINIC | Age: 60
End: 2020-07-08
Payer: COMMERCIAL

## 2020-07-08 DIAGNOSIS — N20.0 RENAL STONES: Primary | ICD-10-CM

## 2020-07-08 PROCEDURE — 99213 OFFICE O/P EST LOW 20 MIN: CPT | Performed by: PHYSICIAN ASSISTANT

## 2020-07-08 NOTE — PROGRESS NOTES
Virtual Brief Visit    Assessment/Plan:    1  Nephrolithiasis, calcium oxalate  - s/p ureteroscopy 3/10/2020  - patient's follow-up ultrasound is negative for evidence obstruction  No residual urinary tract calculi  - we reviewed proper hydration and dietary modifications in order minimize future stone formation  - patient will follow up with Urology on as-needed basis  Encouraged to contact future with any concerns of a passing stone or urologic issues  Patient verbalized understanding  All questions answered  Problem List Items Addressed This Visit     None                Reason for visit is   Chief Complaint   Patient presents with    Virtual Brief Visit        Encounter provider Mireya Michaud PA-C    Provider located at [address] [facility] [address] [place] [address] [facility] [address] [phone]    Recent Visits  No visits were found meeting these conditions  Showing recent visits within past 7 days and meeting all other requirements     Today's Visits  Date Type Provider Dept   07/08/20 Telemedicine Mireya Michaud PA-C  [facility]   Showing today's visits and meeting all other requirements     Future Appointments  No visits were found meeting these conditions  Showing future appointments within next 150 days and meeting all other requirements        After connecting through telephone, the patient was identified by name and date of birth  Hamzah Bradshaw was informed that this is a telemedicine visit and that the visit is being conducted through telephone  My office door was closed  No one else was in the room  She acknowledged consent and understanding of privacy and security of the platform  The patient has agreed to participate and understands she can discontinue the visit at any time      It was my intent to perform this visit via video technology but the patient was not able to do a video connection so the visit was completed via audio telephone only  Patient is aware this is a billable service  Linette Wilkerson is a 63 y o  female with a history of nephrolithiasis presenting for follow-up  Patient had previously been seen in consultation regards to a 6 millimeter ureteral stone  Underwent ureteroscopy 03/10/2020 with ureteral stent removed in the office 3/31/2020  Stone analysis was calcium oxalate in origin  Patient presents today with a follow-up ultrasound of the kidney and bladder  This was negative for any residual nephrolithiasis  No evidence of obstructive uropathy as there were was no hydronephrosis and bilateral ureteral jets were detected  Patient has been doing well over the past few months  Denies any flank pain, dysuria, gross hematuria, interval passage of stones, fevers, or chills        Past Medical History:   Diagnosis Date    Arthritis     Chronic low back pain     GERD (gastroesophageal reflux disease)     Hypercholesteremia     Hypertension     Kidney stone     Lumbar radiculopathy        Past Surgical History:   Procedure Laterality Date    COLONOSCOPY      FL RETROGRADE PYELOGRAM  3/10/2020    LIPOMA RESECTION      stomach    CO CYSTO/URETERO W/LITHOTRIPSY &INDWELL STENT INSRT Left 3/10/2020    Procedure: CYSTOSCOPY URETEROSCOPY WITH LITHOTRIPSY HOLMIUM LASER, RETROGRADE PYELOGRAM AND INSERTION STENT URETERAL;  Surgeon: Allie Ware MD;  Location: [facility];  Service: Urology    WISDOM TOOTH EXTRACTION         Current Outpatient Medications   Medication Sig Dispense Refill    amLODIPine-benazepril (LOTREL 5-20) 5-20 MG per capsule Take 1 capsule by mouth every morning      cyclobenzaprine (FLEXERIL) 5 mg tablet Take 5 mg by mouth daily as needed      gabapentin (NEURONTIN) 300 mg capsule Take 300 mg by mouth 3 (three) times a day as needed        loratadine (CLARITIN) 10 mg tablet Take 10 mg by mouth daily as needed      meloxicam (Mobic) 15 mg tablet Daily      meloxicam (MOBIC) 15 mg tablet       oxybutynin (DITROPAN) 5 mg tablet Take 1 tablet (5 mg total) by mouth 3 (three) times a day as needed (bladder spasms) 20 tablet 0    ranitidine (ZANTAC) 150 MG capsule Take 150 mg by mouth as needed for indigestion or heartburn      triamcinolone (KENALOG) 0 1 % ointment Apply topically daily as needed       No current facility-administered medications for this visit  No Known Allergies    Review of Systems   Constitutional: Negative for activity change, appetite change, chills, diaphoresis, fatigue, fever and unexpected weight change  Respiratory: Negative for chest tightness and shortness of breath  Cardiovascular: Negative for chest pain, palpitations and leg swelling  Gastrointestinal: Negative for abdominal distention, abdominal pain, constipation, diarrhea, nausea and vomiting  Genitourinary: Negative for decreased urine volume, difficulty urinating, dysuria, enuresis, flank pain, frequency, genital sores, hematuria and urgency  Musculoskeletal: Negative for back pain, gait problem and myalgias  Skin: Negative for color change, pallor, rash and wound  Psychiatric/Behavioral: Negative for behavioral problems  The patient is not nervous/anxious  There were no vitals filed for this visit  I spent 15 minutes with patient today in which greater than 50% of the time was spent in counseling/coordination of care regarding nephrolithasis    [address] acknowledges that she has consented to an online visit or consultation  She understands that the online visit is based solely on information provided by her, and that, in the absence of a face-to-face physical evaluation by the physician, the diagnosis she receives is both limited and provisional in terms of accuracy and completeness  This is not intended to replace a full medical face-to-face evaluation by the physician   Leatha Gonzalez understands and accepts these terms

## 2020-08-17 ENCOUNTER — LAB REQUISITION (OUTPATIENT)
Dept: LAB | Facility: HOSPITAL | Age: 60
End: 2020-08-17
Payer: COMMERCIAL

## 2020-08-17 DIAGNOSIS — M48.061 SPINAL STENOSIS, LUMBAR REGION WITHOUT NEUROGENIC CLAUDICATION: ICD-10-CM

## 2020-08-17 PROCEDURE — 88341 IMHCHEM/IMCYTCHM EA ADD ANTB: CPT | Performed by: PATHOLOGY

## 2020-08-17 PROCEDURE — 88342 IMHCHEM/IMCYTCHM 1ST ANTB: CPT | Performed by: PATHOLOGY

## 2020-08-17 PROCEDURE — 88304 TISSUE EXAM BY PATHOLOGIST: CPT | Performed by: PATHOLOGY

## 2022-03-24 ENCOUNTER — TELEPHONE (OUTPATIENT)
Dept: GASTROENTEROLOGY | Facility: CLINIC | Age: 62
End: 2022-03-24

## 2022-03-24 NOTE — TELEPHONE ENCOUNTER
Dr Sanchez Severe office requested fax copy of ethan's colonoscopy   Faxed to [phone] Task completed at 1:47 pm 03/24/22

## 2022-03-25 ENCOUNTER — TELEPHONE (OUTPATIENT)
Dept: GASTROENTEROLOGY | Facility: CLINIC | Age: 62
End: 2022-03-25

## 2023-01-26 NOTE — TELEPHONE ENCOUNTER
Called patient to see if we could shceuld her and appointment - if patient calls back per Barb Mcbride can schedule her tomorrow @ 11:15 with Shani Fitzgerald or Friday 11:30 with Pura Lobato 
I left a voicemail on patient's cell phone (), requesting that she give us a call back to schedule an appointment  Office number was left on the message  I attempted to call the patient's home number (), but voicemail was not set up, and I couldn't leave a message 
Patient is only willing to travel to the [facility] office  Unfortunately, the closest new patient appointment in [facility] is not until April 1st, which isnt appropriate given your time frame   Please advise
Patient presented to ER with flank pain  CT scan identified 6mm left UVJ calculus with hydro  Patient discharged home with Flomax  Please call patient to schedule follow up this week  OK with AP, she is a new patient  Instruct patient to increase water intake, strain urine, and review ER precautions 
Patient returned call scheduled for 02/21 @ 9:30 am
Patient should definitely be seen earlier given size/location of stone and hydronephrosis 
No

## 2025-07-18 ENCOUNTER — HOSPITAL ENCOUNTER (EMERGENCY)
Facility: HOSPITAL | Age: 65
Discharge: HOME | End: 2025-07-18
Attending: EMERGENCY MEDICINE
Payer: COMMERCIAL

## 2025-07-18 ENCOUNTER — APPOINTMENT (EMERGENCY)
Dept: CT IMAGING | Facility: HOSPITAL | Age: 65
End: 2025-07-18
Payer: COMMERCIAL

## 2025-07-18 VITALS
RESPIRATION RATE: 17 BRPM | OXYGEN SATURATION: 95 % | HEIGHT: 62 IN | WEIGHT: 184 LBS | HEART RATE: 66 BPM | BODY MASS INDEX: 33.86 KG/M2 | SYSTOLIC BLOOD PRESSURE: 136 MMHG | DIASTOLIC BLOOD PRESSURE: 66 MMHG | TEMPERATURE: 98.4 F

## 2025-07-18 DIAGNOSIS — R10.9 ABDOMINAL PAIN: Primary | ICD-10-CM

## 2025-07-18 DIAGNOSIS — R19.7 NAUSEA VOMITING AND DIARRHEA: ICD-10-CM

## 2025-07-18 DIAGNOSIS — R11.2 NAUSEA VOMITING AND DIARRHEA: ICD-10-CM

## 2025-07-18 LAB
ALBUMIN SERPL BCG-MCNC: 4.3 G/DL (ref 3.5–5)
ALP SERPL-CCNC: 76 U/L (ref 34–104)
ALT SERPL W P-5'-P-CCNC: 24 U/L (ref 7–52)
ANION GAP SERPL CALCULATED.3IONS-SCNC: 11 MMOL/L (ref 4–13)
AST SERPL W P-5'-P-CCNC: 22 U/L (ref 13–39)
BACTERIA UR QL AUTO: ABNORMAL /HPF
BASOPHILS # BLD MANUAL: 0 THOUSAND/UL (ref 0–0.1)
BASOPHILS NFR MAR MANUAL: 0 % (ref 0–1)
BILIRUB SERPL-MCNC: 0.39 MG/DL (ref 0.2–1)
BILIRUB UR QL STRIP: NEGATIVE
BUN SERPL-MCNC: 15 MG/DL (ref 5–25)
CALCIUM SERPL-MCNC: 10.7 MG/DL (ref 8.4–10.2)
CHLORIDE SERPL-SCNC: 100 MMOL/L (ref 96–108)
CLARITY UR: CLEAR
CO2 SERPL-SCNC: 26 MMOL/L (ref 21–32)
COLOR UR: YELLOW
CREAT SERPL-MCNC: 0.92 MG/DL (ref 0.6–1.3)
EOSINOPHIL # BLD MANUAL: 0 THOUSAND/UL (ref 0–0.4)
EOSINOPHIL NFR BLD MANUAL: 0 % (ref 0–6)
ERYTHROCYTE [DISTWIDTH] IN BLOOD BY AUTOMATED COUNT: 13.3 % (ref 11.6–15.1)
GFR SERPL CREATININE-BSD FRML MDRD: 65 ML/MIN/1.73SQ M
GLUCOSE SERPL-MCNC: 154 MG/DL (ref 65–140)
GLUCOSE UR STRIP-MCNC: NEGATIVE MG/DL
HCT VFR BLD AUTO: 40.1 % (ref 34.8–46.1)
HGB BLD-MCNC: 13.5 G/DL (ref 11.5–15.4)
HGB UR QL STRIP.AUTO: NEGATIVE
HYALINE CASTS #/AREA URNS LPF: ABNORMAL /LPF
KETONES UR STRIP-MCNC: NEGATIVE MG/DL
LEUKOCYTE ESTERASE UR QL STRIP: NEGATIVE
LIPASE SERPL-CCNC: 10 U/L (ref 11–82)
LYMPHOCYTES # BLD AUTO: 1.04 THOUSAND/UL (ref 0.6–4.47)
LYMPHOCYTES # BLD AUTO: 8 % (ref 14–44)
MCH RBC QN AUTO: 31.3 PG (ref 26.8–34.3)
MCHC RBC AUTO-ENTMCNC: 33.7 G/DL (ref 31.4–37.4)
MCV RBC AUTO: 93 FL (ref 82–98)
MONOCYTES # BLD AUTO: 0.35 THOUSAND/UL (ref 0–1.22)
MONOCYTES NFR BLD: 3 % (ref 4–12)
MUCOUS THREADS UR QL AUTO: ABNORMAL
NEUTROPHILS # BLD MANUAL: 10.19 THOUSAND/UL (ref 1.85–7.62)
NEUTS BAND NFR BLD MANUAL: 2 % (ref 0–8)
NEUTS SEG NFR BLD AUTO: 86 % (ref 43–75)
NITRITE UR QL STRIP: NEGATIVE
NON-SQ EPI CELLS URNS QL MICRO: ABNORMAL /HPF
PH UR STRIP.AUTO: 5.5 [PH]
PLATELET # BLD AUTO: 250 THOUSANDS/UL (ref 149–390)
PLATELET BLD QL SMEAR: ADEQUATE
PMV BLD AUTO: 10.2 FL (ref 8.9–12.7)
POTASSIUM SERPL-SCNC: 4 MMOL/L (ref 3.5–5.3)
PROT SERPL-MCNC: 7.4 G/DL (ref 6.4–8.4)
PROT UR STRIP-MCNC: ABNORMAL MG/DL
RBC # BLD AUTO: 4.31 MILLION/UL (ref 3.81–5.12)
RBC #/AREA URNS AUTO: ABNORMAL /HPF
RBC MORPH BLD: NORMAL
SODIUM SERPL-SCNC: 137 MMOL/L (ref 135–147)
SP GR UR STRIP.AUTO: 1.02 (ref 1–1.03)
UROBILINOGEN UR STRIP-ACNC: <2 MG/DL
VARIANT LYMPHS # BLD AUTO: 1 %
WBC # BLD AUTO: 11.58 THOUSAND/UL (ref 4.31–10.16)
WBC #/AREA URNS AUTO: ABNORMAL /HPF

## 2025-07-18 PROCEDURE — 83690 ASSAY OF LIPASE: CPT

## 2025-07-18 PROCEDURE — 85007 BL SMEAR W/DIFF WBC COUNT: CPT

## 2025-07-18 PROCEDURE — 96375 TX/PRO/DX INJ NEW DRUG ADDON: CPT

## 2025-07-18 PROCEDURE — 96361 HYDRATE IV INFUSION ADD-ON: CPT

## 2025-07-18 PROCEDURE — 74177 CT ABD & PELVIS W/CONTRAST: CPT

## 2025-07-18 PROCEDURE — 80053 COMPREHEN METABOLIC PANEL: CPT

## 2025-07-18 PROCEDURE — 96374 THER/PROPH/DIAG INJ IV PUSH: CPT

## 2025-07-18 PROCEDURE — 36415 COLL VENOUS BLD VENIPUNCTURE: CPT

## 2025-07-18 PROCEDURE — 99285 EMERGENCY DEPT VISIT HI MDM: CPT

## 2025-07-18 PROCEDURE — 81001 URINALYSIS AUTO W/SCOPE: CPT

## 2025-07-18 PROCEDURE — 85027 COMPLETE CBC AUTOMATED: CPT

## 2025-07-18 PROCEDURE — 99284 EMERGENCY DEPT VISIT MOD MDM: CPT

## 2025-07-18 RX ORDER — DICYCLOMINE HCL 20 MG
20 TABLET ORAL 2 TIMES DAILY
Qty: 20 TABLET | Refills: 0 | Status: SHIPPED | OUTPATIENT
Start: 2025-07-18

## 2025-07-18 RX ORDER — MORPHINE SULFATE 4 MG/ML
4 INJECTION, SOLUTION INTRAMUSCULAR; INTRAVENOUS ONCE
Status: COMPLETED | OUTPATIENT
Start: 2025-07-18 | End: 2025-07-18

## 2025-07-18 RX ORDER — ONDANSETRON 4 MG/1
4 TABLET, ORALLY DISINTEGRATING ORAL EVERY 6 HOURS PRN
Qty: 12 TABLET | Refills: 0 | Status: SHIPPED | OUTPATIENT
Start: 2025-07-18

## 2025-07-18 RX ORDER — ONDANSETRON 2 MG/ML
4 INJECTION INTRAMUSCULAR; INTRAVENOUS ONCE
Status: COMPLETED | OUTPATIENT
Start: 2025-07-18 | End: 2025-07-18

## 2025-07-18 RX ADMIN — SODIUM CHLORIDE 500 ML: 0.9 INJECTION, SOLUTION INTRAVENOUS at 03:51

## 2025-07-18 RX ADMIN — ONDANSETRON 4 MG: 2 INJECTION INTRAMUSCULAR; INTRAVENOUS at 03:51

## 2025-07-18 RX ADMIN — IOHEXOL 100 ML: 350 INJECTION, SOLUTION INTRAVENOUS at 05:06

## 2025-07-18 RX ADMIN — MORPHINE SULFATE 4 MG: 4 INJECTION INTRAVENOUS at 03:51

## 2025-07-18 NOTE — DISCHARGE INSTRUCTIONS
Follow-up with PCP.  Rest and hydrate.  Tylenol Motrin as needed.  Bentyl and Zofran as needed.  Start with clear liquids advancing to bland diet as discussed.  If any symptoms worsen or new symptoms appear return to the ER.

## 2025-07-18 NOTE — ED PROVIDER NOTES
Time reflects when diagnosis was documented in both MDM as applicable and the Disposition within this note       Time User Action Codes Description Comment    7/18/2025  6:32 AM Terrence Knutson Add [R10.9] Abdominal pain     7/18/2025  6:33 AM Terrence Knutson Add [R11.2,  R19.7] Nausea vomiting and diarrhea           ED Disposition       ED Disposition   Discharge    Condition   Stable    Date/Time   Fri Jul 18, 2025  6:33 AM    Comment   Daphne Wright discharge to home/self care.                   Assessment & Plan       Medical Decision Making  This patient presents with nausea, vomiting & diarrhea. Differential diagnosis includes possible acute gastroenteritis. Abdominal exam without peritoneal signs. Currently euvolemic without evidence of dehydration. Doubt invasive bacteria causing diarrhea such as C diff (no recent antibiotics), shiga toxin. No recent travel. Patient is not immunocompromised. No evidence of surgical abdomen or other acute medical emergency including bowel obstruction, viscus perforation, vascular catastrophe, atypical appendicitis, acute cholecystitis, UGIB, thyrotoxicosis, or diverticulitis at this time. Presentation not consistent with other acute, emergent causes of vomiting / diarrhea at this time. CT abdomen and pelvis showed Mural thickening and mild pericolonic stranding of the mid transverse and descending colon, likely infectious/inflammatory colitis.  Recommended resting and hydrating.  Patient will follow-up closely with PCP.  Close no consideration of bland diet.  Zofran and Bentyl as needed.  Discussed signs and symptoms that would necessitate return to the ER. Prior to discharge, discharge instructions were discussed with patient at bedside. Patient was provided both verbal and written instructions. Patient is understanding of the discharge instructions and is agreeable to plan of care. Return precautions were discussed with patient bedside, patient verbalized understanding of  signs and symptoms that would necessitate return to the ED. All questions were answered. Patient was comfortable with the plan of care and discharged to home.       Problems Addressed:  Abdominal pain: acute illness or injury  Nausea vomiting and diarrhea: acute illness or injury    Amount and/or Complexity of Data Reviewed  Radiology: ordered. Decision-making details documented in ED Course.    Risk  Prescription drug management.        ED Course as of 07/18/25 0736   Fri Jul 18, 2025   0540 CT abdomen pelvis with contrast    Mural thickening and mild pericolonic stranding of the mid transverse and descending colon, likely infectious/inflammatory colitis.          Medications   ondansetron (ZOFRAN) injection 4 mg (4 mg Intravenous Given 7/18/25 0351)   morphine injection 4 mg (4 mg Intravenous Given 7/18/25 0351)   sodium chloride 0.9 % bolus 500 mL (0 mL Intravenous Stopped 7/18/25 0451)   iohexol (OMNIPAQUE) 350 MG/ML injection (MULTI-DOSE) 100 mL (100 mL Intravenous Given 7/18/25 0506)       ED Risk Strat Scores                    No data recorded        SBIRT 20yo+      Flowsheet Row Most Recent Value   Initial Alcohol Screen: US AUDIT-C     1. How often do you have a drink containing alcohol? 0 Filed at: 07/18/2025 0103   2. How many drinks containing alcohol do you have on a typical day you are drinking?  0 Filed at: 07/18/2025 0103   3b. FEMALE Any Age, or MALE 65+: How often do you have 4 or more drinks on one occassion? 0 Filed at: 07/18/2025 0103   Audit-C Score 0 Filed at: 07/18/2025 0103   OLE: How many times in the past year have you...    Used an illegal drug or used a prescription medication for non-medical reasons? Never Filed at: 07/18/2025 0103                            History of Present Illness       Chief Complaint   Patient presents with    Abdominal Pain     Pt c/o abd pain, vomiting, and blood in stool that began around 1600 this afternoon.        Past Medical History[1]   Past Surgical  History[2]   Family History[3]   Social History[4]   E-Cigarette/Vaping    E-Cigarette Use Never User       E-Cigarette/Vaping Substances      I have reviewed and agree with the history as documented.     The patient is a 65 y.o. female with a history of arthritis, chronic low back pain, GERD, hypercholesterolemia, hypertension, kidney stone who presents to [facility] with a chief complaint of abdominal pain. Symptoms began yesterday evening and have been constant since onset. Her pain is currently rated as a 5/10 in severity and described as sharp and crampy generalized abdominal pain without radiation. Associated symptoms include nausea, vomiting, and diarrhea. Symptoms are aggravated with none noted and alleviating factors include none noted. The patient denies fever, chills, night sweats, chest pain, shortness of breath, cough, sputum, hemoptysis, hematemesis, hematochezia, melena, dysuria, frequency, urgency, hesitancy, syncope, falls, trauma. No other reported symptoms at this time.  Patient affirms allergies to ibuprofen           History provided by:  Patient and significant other   used: No    Abdominal Pain  Associated symptoms: diarrhea, nausea and vomiting    Associated symptoms: no chest pain, no chills, no cough, no dysuria, no fever, no hematuria, no shortness of breath and no sore throat        Review of Systems   Constitutional:  Negative for chills and fever.   HENT:  Negative for ear pain and sore throat.    Eyes:  Negative for pain and visual disturbance.   Respiratory:  Negative for cough and shortness of breath.    Cardiovascular:  Negative for chest pain and palpitations.   Gastrointestinal:  Positive for abdominal pain, diarrhea, nausea and vomiting.   Genitourinary:  Negative for dysuria and hematuria.   Musculoskeletal:  Negative for arthralgias and back pain.   Skin:  Negative for color change and rash.   Neurological:  Negative for dizziness, seizures,  syncope, facial asymmetry, light-headedness and headaches.   All other systems reviewed and are negative.          Objective       ED Triage Vitals   Temperature Pulse Blood Pressure Respirations SpO2 Patient Position - Orthostatic VS   07/18/25 0100 07/18/25 0100 07/18/25 0100 07/18/25 0100 07/18/25 0100 07/18/25 0100   98.4 °F (36.9 °C) 55 137/64 18 97 % Sitting      Temp Source Heart Rate Source BP Location FiO2 (%) Pain Score    07/18/25 0100 07/18/25 0100 07/18/25 0100 -- 07/18/25 0351    Oral Monitor Left arm  6      Vitals      Date and Time Temp Pulse SpO2 Resp BP Pain Score FACES Pain Rating User   07/18/25 0600 -- 66 95 % -- 136/66 -- --    07/18/25 0530 -- 67 92 % -- 127/66 -- --    07/18/25 0514 -- 70 96 % -- 126/61 -- --    07/18/25 0351 -- 61 97 % 17 133/63 6 -- KT   07/18/25 0100 98.4 °F (36.9 °C) 55 97 % 18 137/64 -- -- JK            Physical Exam  Vitals reviewed.   Constitutional:       General: She is not in acute distress.     Appearance: She is not ill-appearing or toxic-appearing.   HENT:      Head: Normocephalic.      Mouth/Throat:      Mouth: Mucous membranes are moist.     Eyes:      General: No scleral icterus.      Cardiovascular:      Rate and Rhythm: Normal rate.   Pulmonary:      Effort: Pulmonary effort is normal. No respiratory distress.      Breath sounds: No stridor. No wheezing, rhonchi or rales.   Abdominal:      General: Abdomen is flat. Bowel sounds are normal.      Palpations: Abdomen is soft.      Tenderness: There is generalized abdominal tenderness.     Skin:     General: Skin is warm and dry.      Capillary Refill: Capillary refill takes less than 2 seconds.      Coloration: Skin is not cyanotic, jaundiced, mottled or pale.      Findings: No erythema.     Neurological:      General: No focal deficit present.      Mental Status: She is alert and oriented to person, place, and time.         Results Reviewed       Procedure Component Value Units Date/Time    Urine  Microscopic [[id number]]  (Abnormal) Collected: 07/18/25 0335    Lab Status: Final result Specimen: Urine, Clean Catch Updated: 07/18/25 0347     RBC, UA 1-2 /hpf      WBC, UA 1-2 /hpf      Epithelial Cells Occasional /hpf      Bacteria, UA Occasional /hpf      MUCUS THREADS Moderate     Hyaline Casts, UA 0-3 /lpf     UA w Reflex to Microscopic w Reflex to Culture [[id number]]  (Abnormal) Collected: 07/18/25 0335    Lab Status: Final result Specimen: Urine, Clean Catch Updated: 07/18/25 0341     Color, UA Yellow     Clarity, UA Clear     Specific Gravity, UA 1.025     pH, UA 5.5     Leukocytes, UA Negative     Nitrite, UA Negative     Protein, UA Trace mg/dl      Glucose, UA Negative mg/dl      Ketones, UA Negative mg/dl      Urobilinogen, UA <2.0 mg/dl      Bilirubin, UA Negative     Occult Blood, UA Negative    Manual Differential(PHLEBS Do Not Order) [[id number]]  (Abnormal) Collected: 07/18/25 0203    Lab Status: Final result Specimen: Blood from Hand, Right Updated: 07/18/25 0234     Segmented % 86 %      Bands % 2 %      Lymphocytes % 8 %      Monocytes % 3 %      Eosinophils % 0 %      Basophils % 0 %      Atypical Lymphocytes % 1 %      Absolute Neutrophils 10.19 Thousand/uL      Absolute Lymphocytes 1.04 Thousand/uL      Absolute Monocytes 0.35 Thousand/uL      Absolute Eosinophils 0.00 Thousand/uL      Absolute Basophils 0.00 Thousand/uL      Total Counted --     RBC Morphology Normal     Platelet Estimate Adequate    Comprehensive metabolic panel [[id number]]  (Abnormal) Collected: 07/18/25 0203    Lab Status: Final result Specimen: Blood from Hand, Right Updated: 07/18/25 0233     Sodium 137 mmol/L      Potassium 4.0 mmol/L      Chloride 100 mmol/L      CO2 26 mmol/L      ANION GAP 11 mmol/L      BUN 15 mg/dL      Creatinine 0.92 mg/dL      Glucose 154 mg/dL      Calcium 10.7 mg/dL      AST 22 U/L      ALT 24 U/L      Alkaline Phosphatase 76 U/L      Total Protein 7.4 g/dL      Albumin 4.3 g/dL      Total  Bilirubin 0.39 mg/dL      eGFR 65 ml/min/1.73sq m     Narrative:      National Kidney Disease Foundation guidelines for Chronic Kidney Disease (CKD):     Stage 1 with normal or high GFR (GFR > 90 mL/min/1.73 square meters)    Stage 2 Mild CKD (GFR = 60-89 mL/min/1.73 square meters)    Stage 3A Moderate CKD (GFR = 45-59 mL/min/1.73 square meters)    Stage 3B Moderate CKD (GFR = 30-44 mL/min/1.73 square meters)    Stage 4 Severe CKD (GFR = 15-29 mL/min/1.73 square meters)    Stage 5 End Stage CKD (GFR <15 mL/min/1.73 square meters)  Note: GFR calculation is accurate only with a steady state creatinine    Lipase [[id number]]  (Abnormal) Collected: 07/18/25 0203    Lab Status: Final result Specimen: Blood from Hand, Right Updated: 07/18/25 0233     Lipase 10 u/L     CBC and differential [[id number]]  (Abnormal) Collected: 07/18/25 0203    Lab Status: Final result Specimen: Blood from Hand, Right Updated: 07/18/25 0212     WBC 11.58 Thousand/uL      RBC 4.31 Million/uL      Hemoglobin 13.5 g/dL      Hematocrit 40.1 %      MCV 93 fL      MCH 31.3 pg      MCHC 33.7 g/dL      RDW 13.3 %      MPV 10.2 fL      Platelets 250 Thousands/uL             CT abdomen pelvis with contrast   Final Interpretation by Jason Le MD (07/18 0534)      Mural thickening and mild pericolonic stranding of the mid transverse and descending colon, likely infectious/inflammatory colitis.         Workstation performed: [id number]             Procedures    ED Medication and Procedure Management   Prior to Admission Medications   Prescriptions Last Dose Informant Patient Reported? Taking?   amLODIPine-benazepril (LOTREL 5-20) 5-20 MG per capsule  Self Yes No   Sig: Take 1 capsule by mouth every morning   cyclobenzaprine (FLEXERIL) 5 mg tablet  Self Yes No   Sig: Take 5 mg by mouth daily as needed   gabapentin (NEURONTIN) 300 mg capsule  Self Yes No   Sig: Take 300 mg by mouth 3 (three) times a day as needed     loratadine (CLARITIN) 10  mg tablet  Self Yes No   Sig: Take 10 mg by mouth daily as needed   meloxicam (MOBIC) 15 mg tablet  Self Yes No   meloxicam (Mobic) 15 mg tablet  Self Yes No   Sig: Daily   oxybutynin (DITROPAN) 5 mg tablet  Self No No   Sig: Take 1 tablet (5 mg total) by mouth 3 (three) times a day as needed (bladder spasms)   ranitidine (ZANTAC) 150 MG capsule  Self Yes No   Sig: Take 150 mg by mouth as needed for indigestion or heartburn   triamcinolone (KENALOG) 0.1 % ointment  Self Yes No   Sig: Apply topically daily as needed      Facility-Administered Medications: None     Discharge Medication List as of 7/18/2025  6:34 AM        START taking these medications    Details   dicyclomine (BENTYL) 20 mg tablet Take 1 tablet (20 mg total) by mouth 2 (two) times a day, Starting Fri 7/18/2025, Normal      ondansetron (ZOFRAN-ODT) 4 mg disintegrating tablet Take 1 tablet (4 mg total) by mouth every 6 (six) hours as needed for vomiting or nausea, Starting Fri 7/18/2025, Normal           CONTINUE these medications which have NOT CHANGED    Details   amLODIPine-benazepril (LOTREL 5-20) 5-20 MG per capsule Take 1 capsule by mouth every morning, Starting Mon 7/30/2018, Historical Med      cyclobenzaprine (FLEXERIL) 5 mg tablet Take 5 mg by mouth daily as needed, Starting Wed 3/14/2018, Historical Med      gabapentin (NEURONTIN) 300 mg capsule Take 300 mg by mouth 3 (three) times a day as needed  , Starting Mon 8/7/2017, Historical Med      loratadine (CLARITIN) 10 mg tablet Take 10 mg by mouth daily as needed, Starting Fri 3/9/2018, Historical Med      !! meloxicam (Mobic) 15 mg tablet Daily, Historical Med      !! meloxicam (MOBIC) 15 mg tablet Starting Tue 1/21/2020, Historical Med      oxybutynin (DITROPAN) 5 mg tablet Take 1 tablet (5 mg total) by mouth 3 (three) times a day as needed (bladder spasms), Starting Tue 3/10/2020, Normal      ranitidine (ZANTAC) 150 MG capsule Take 150 mg by mouth as needed for indigestion or heartburn,  Historical Med      triamcinolone (KENALOG) 0.1 % ointment Apply topically daily as needed, Starting Wed 3/22/2017, Historical Med       !! - Potential duplicate medications found. Please discuss with provider.        No discharge procedures on file.  ED SEPSIS DOCUMENTATION   Time reflects when diagnosis was documented in both MDM as applicable and the Disposition within this note       Time User Action Codes Description Comment    7/18/2025  6:32 AM Terrence Knutson Add [R10.9] Abdominal pain     7/18/2025  6:33 AM Terrence Knutson Add [R11.2,  R19.7] Nausea vomiting and diarrhea                      [1]   Past Medical History:  Diagnosis Date    Arthritis     Chronic low back pain     GERD (gastroesophageal reflux disease)     Hypercholesteremia     Hypertension     Kidney stone     Lumbar radiculopathy    [2]   Past Surgical History:  Procedure Laterality Date    COLONOSCOPY      FL RETROGRADE PYELOGRAM  3/10/2020    LIPOMA RESECTION      stomach    MS CYSTO/URETERO W/LITHOTRIPSY &INDWELL STENT INSRT Left 3/10/2020    Procedure: CYSTOSCOPY URETEROSCOPY WITH LITHOTRIPSY HOLMIUM LASER, RETROGRADE PYELOGRAM AND INSERTION STENT URETERAL;  Surgeon: Antonio Dunlap MD;  Location[facility];  Service: Urology    WISDOM TOOTH EXTRACTION     [3]   Family History  Problem Relation Name Age of Onset    Heart disease Mother      Diabetes Mother      Hypertension Mother      No Known Problems Father      No Known Problems Sister      Diabetes Brother      Alzheimer's disease Maternal Grandmother     [4]   Social History  Tobacco Use    Smoking status: Never    Smokeless tobacco: Never   Vaping Use    Vaping status: Never Used   Substance Use Topics    Alcohol use: Yes     Comment: socially    Drug use: No        Terrence Knutson PA-C  07/18/25 0736

## (undated) DEVICE — PACK TUR

## (undated) DEVICE — GUIDEWIRE STRGHT TIP 0.035 IN  SOLO PLUS

## (undated) DEVICE — CATH FOLEY 16FR 5ML 2 WAY UNCOATED SILICONE

## (undated) DEVICE — UROCATCH BAG

## (undated) DEVICE — LUBRICANT SURGILUBE TUBE 4 OZ  FLIP TOP

## (undated) DEVICE — TUBING SUCTION 5MM X 12 FT

## (undated) DEVICE — CATH URET .038 10FR 50CM DUAL LUMEN

## (undated) DEVICE — GLOVE SRG BIOGEL 7.5

## (undated) DEVICE — CATH URETERAL 5FR X 70 CM FLEX TIP POLYUR BARD

## (undated) DEVICE — BASKET SPECIMEN RETRIVAL 1.9FR 120CM

## (undated) DEVICE — 200 MICRON SINGLE-USE HOLMIUM FIBER ASSEMBLY WITH FLAT TIP: Brand: OPTILITE

## (undated) DEVICE — INVIEW CLEAR LEGGINGS: Brand: CONVERTORS